# Patient Record
Sex: MALE | Race: BLACK OR AFRICAN AMERICAN | NOT HISPANIC OR LATINO | Employment: FULL TIME | URBAN - NONMETROPOLITAN AREA
[De-identification: names, ages, dates, MRNs, and addresses within clinical notes are randomized per-mention and may not be internally consistent; named-entity substitution may affect disease eponyms.]

---

## 2017-01-12 ENCOUNTER — OFFICE VISIT (OUTPATIENT)
Dept: INTERNAL MEDICINE | Facility: CLINIC | Age: 27
End: 2017-01-12

## 2017-01-12 VITALS
WEIGHT: 282 LBS | SYSTOLIC BLOOD PRESSURE: 150 MMHG | DIASTOLIC BLOOD PRESSURE: 90 MMHG | TEMPERATURE: 99 F | HEART RATE: 73 BPM | HEIGHT: 72 IN | OXYGEN SATURATION: 98 % | BODY MASS INDEX: 38.19 KG/M2

## 2017-01-12 DIAGNOSIS — K62.89 ANAL PAIN: ICD-10-CM

## 2017-01-12 DIAGNOSIS — Z23 NEED FOR INFLUENZA VACCINATION: ICD-10-CM

## 2017-01-12 DIAGNOSIS — IMO0001 ELEVATED BLOOD PRESSURE: ICD-10-CM

## 2017-01-12 DIAGNOSIS — R74.8 ELEVATED CREATINE KINASE LEVEL: ICD-10-CM

## 2017-01-12 DIAGNOSIS — G43.809 OTHER MIGRAINE WITHOUT STATUS MIGRAINOSUS, NOT INTRACTABLE: Primary | ICD-10-CM

## 2017-01-12 LAB
ALBUMIN SERPL-MCNC: 4.3 G/DL (ref 3.2–4.8)
ALBUMIN/GLOB SERPL: 1.5 G/DL (ref 1.5–2.5)
ALP SERPL-CCNC: 68 U/L (ref 25–100)
ALT SERPL W P-5'-P-CCNC: 36 U/L (ref 7–40)
ANION GAP SERPL CALCULATED.3IONS-SCNC: 15 MMOL/L (ref 3–11)
AST SERPL-CCNC: 42 U/L (ref 0–33)
BILIRUB SERPL-MCNC: 0.4 MG/DL (ref 0.3–1.2)
BUN BLD-MCNC: 14 MG/DL (ref 9–23)
BUN/CREAT SERPL: 12.7 (ref 7–25)
CALCIUM SPEC-SCNC: 10.2 MG/DL (ref 8.7–10.4)
CHLORIDE SERPL-SCNC: 103 MMOL/L (ref 99–109)
CK SERPL-CCNC: 1183 U/L (ref 26–174)
CO2 SERPL-SCNC: 27 MMOL/L (ref 20–31)
CREAT BLD-MCNC: 1.1 MG/DL (ref 0.6–1.3)
GFR SERPL CREATININE-BSD FRML MDRD: 100 ML/MIN/1.73
GLOBULIN UR ELPH-MCNC: 2.9 GM/DL
GLUCOSE BLD-MCNC: 89 MG/DL (ref 70–100)
POTASSIUM BLD-SCNC: 4.2 MMOL/L (ref 3.5–5.5)
PROT SERPL-MCNC: 7.2 G/DL (ref 5.7–8.2)
SODIUM BLD-SCNC: 145 MMOL/L (ref 132–146)

## 2017-01-12 PROCEDURE — 82550 ASSAY OF CK (CPK): CPT | Performed by: FAMILY MEDICINE

## 2017-01-12 PROCEDURE — 36415 COLL VENOUS BLD VENIPUNCTURE: CPT | Performed by: FAMILY MEDICINE

## 2017-01-12 PROCEDURE — 90471 IMMUNIZATION ADMIN: CPT | Performed by: FAMILY MEDICINE

## 2017-01-12 PROCEDURE — 90686 IIV4 VACC NO PRSV 0.5 ML IM: CPT | Performed by: FAMILY MEDICINE

## 2017-01-12 PROCEDURE — 99214 OFFICE O/P EST MOD 30 MIN: CPT | Performed by: FAMILY MEDICINE

## 2017-01-12 PROCEDURE — 80053 COMPREHEN METABOLIC PANEL: CPT | Performed by: FAMILY MEDICINE

## 2017-01-12 RX ORDER — SUMATRIPTAN 25 MG/1
25 TABLET, FILM COATED ORAL ONCE AS NEEDED
Qty: 9 TABLET | Refills: 0 | Status: SHIPPED | OUTPATIENT
Start: 2017-01-12 | End: 2017-01-25

## 2017-01-12 NOTE — PROGRESS NOTES
Subjective   Britton Vogt Jr. is a 26 y.o. male.     History of Present Illness   Has sharp pain at times going down leg on right only. Sometimes just an annoying pain. Pain only on right side of head. Gets headaches only on the right. Pain described as sometimes constant, sometimes throbbing at temple. Goes toward ear and does same there. No tinnitus. Not dizzy. Headaches happen daily, not same time. It woke him up from sleep last night. Took Excedrin before work a few days ago and that did not help. Sometimes gets nausea with headaches. Denies photo- and phonophobia. He tried going back to working out but did not gain any muscle, stopped after a few months. Sometimes gets sharp pains in anus. If he lays a certain way it hurts. Has had that pain for a few weeks, feels like it's getting worse. No mucus, pus in stool nor blood or black color. No straining for BM, he feels they're normal.    The following portions of the patient's history were reviewed and updated as appropriate: allergies, current medications, past family history, past medical history, past social history, past surgical history and problem list.    Review of Systems   Gastrointestinal: Negative for abdominal pain, blood in stool and constipation.   Musculoskeletal: Positive for back pain.       Objective   Physical Exam   Constitutional: He is oriented to person, place, and time. He appears well-developed and well-nourished. He does not appear ill. No distress.   Appears stated age. Well groomed.      HENT:   Head: Normocephalic and atraumatic.   Eyes: EOM are normal. Pupils are equal, round, and reactive to light.   Pulmonary/Chest: Effort normal.   Genitourinary: Rectal exam shows no external hemorrhoid, no fissure, no mass, no tenderness and anal tone normal.   Neurological: He is alert and oriented to person, place, and time.   Skin: Skin is warm. He is not diaphoretic.   Psychiatric: His speech is normal and behavior is normal. Judgment  normal. His mood appears anxious.   Nursing note and vitals reviewed.     last fall.  MRI brain not worrisome.    Assessment/Plan   Britton was seen today for back pain.    Diagnoses and all orders for this visit:    Other migraine without status migrainosus, not intractable  -     SUMAtriptan (IMITREX) 25 MG tablet; Take 1 tablet by mouth 1 (One) Time As Needed for migraine for up to 1 dose.    Anal pain    Elevated creatine kinase level  -     Comprehensive Metabolic Panel; Future  -     CK; Future  -     Comprehensive Metabolic Panel  -     CK    Elevated blood pressure    Need for influenza vaccination  -     Flu Vaccine Greater Than or Equal To 4yo Preservative Free IM      Influenza vaccination discussed and given. Educated patient it takes 2 weeks to develop antibodies so not fully protected immediately. Also educated that the vaccination does not cause the flu.  Encouraged neurology follow up. Need to consider muscle biopsy with continued muscle issues. Unclear what the anal pain is from, could be related to his back issues and I encouraged him to mention at neurology visit. Jaylene Crowley cma, served as chaperone for exam. BP up some but patient did not know my clinic had moved, was late getting here as he went to the [place] office. Normal BPs in the past.

## 2017-01-12 NOTE — MR AVS SNAPSHOT
Britton Vogt JrCici   1/12/2017 3:15 PM   Office Visit    Provider:  Eliza Mayberry MD   Department:  [facility] PRIMARY CARE   Dept Phone:  [phone]                Your Full Care Plan              Today's Medication Changes          These changes are accurate as of: 1/12/17  4:42 PM.  If you have any questions, ask your nurse or doctor.               New Medication(s)Ordered:     SUMAtriptan 25 MG tablet   Commonly known as:  IMITREX   Take 1 tablet by mouth 1 (One) Time As Needed for migraine for up to 1 dose.   Started by:  Eliaz Mayberry MD            Where to Get Your Medications      These medications were sent to [facility]/pharmacy #[id number] - [place], [place] - [address] [phone] [place] [phone]   [address] [address]    Hours:  24-hours Phone:  [phone]     SUMAtriptan 25 MG tablet                  Your Updated Medication List          This list is accurate as of: 1/12/17  4:42 PM.  Always use your most recent med list.                SUMAtriptan 25 MG tablet   Commonly known as:  IMITREX   Take 1 tablet by mouth 1 (One) Time As Needed for migraine for up to 1 dose.               We Performed the Following     Flu Vaccine Greater Than or Equal To 2yo Preservative Free IM       You Were Diagnosed With        Codes Comments    Other migraine without status migrainosus, not intractable    -  Primary ICD-10-CM: G43.809     Elevated creatine kinase level     ICD-10-CM: R74.8  ICD-9-CM: 790.5     Need for influenza vaccination     ICD-10-CM: Z23  ICD-9-CM: V04.81       Instructions     None    Patient Instructions History      MyChart Signup     Our records indicate that you have declined [facility] [employer] signup. If you would like to sign up for [employer], please email [email or web address] or call [phone] to obtain an activation code.             Other Info from Your Visit           Your Appointments     "Jan 25, 2017  8:00 AM EST   Follow Up with RENAY Fernandes   [facility] NEUROLOGY (--)    [address]  [facility] [address] [phone]           Arrive 15 minutes prior to appointment.              Allergies     No Known Allergies      Reason for Visit     Back Pain 6MO F/U      Vital Signs     Blood Pressure Pulse Temperature Height Weight Oxygen Saturation    150/90 73 99 °F (37.2 °C) 72\" (182.9 cm) 282 lb (128 kg) 98%    Body Mass Index Smoking Status                38.25 kg/m2 Never Smoker          Problems and Diagnoses Noted     Elevated creatine kinase level    Migraines    Needs flu shot          "

## 2017-01-13 DIAGNOSIS — R74.8 ELEVATED CREATINE KINASE LEVEL: Primary | ICD-10-CM

## 2017-01-13 DIAGNOSIS — R25.3 MUSCLE TWITCH: ICD-10-CM

## 2017-01-25 ENCOUNTER — OFFICE VISIT (OUTPATIENT)
Dept: NEUROLOGY | Facility: CLINIC | Age: 27
End: 2017-01-25

## 2017-01-25 VITALS
HEIGHT: 72 IN | BODY MASS INDEX: 37.93 KG/M2 | HEART RATE: 86 BPM | RESPIRATION RATE: 18 BRPM | DIASTOLIC BLOOD PRESSURE: 80 MMHG | SYSTOLIC BLOOD PRESSURE: 124 MMHG | OXYGEN SATURATION: 99 % | WEIGHT: 280 LBS

## 2017-01-25 DIAGNOSIS — R29.2 HYPOREFLEXIA: ICD-10-CM

## 2017-01-25 DIAGNOSIS — M51.36 BULGING LUMBAR DISC: ICD-10-CM

## 2017-01-25 DIAGNOSIS — R74.8 ELEVATED CREATINE KINASE LEVEL: Primary | ICD-10-CM

## 2017-01-25 DIAGNOSIS — R53.1 GENERALIZED WEAKNESS: ICD-10-CM

## 2017-01-25 DIAGNOSIS — G43.809 OTHER MIGRAINE WITHOUT STATUS MIGRAINOSUS, NOT INTRACTABLE: ICD-10-CM

## 2017-01-25 DIAGNOSIS — K62.89 ANAL PAIN: ICD-10-CM

## 2017-01-25 DIAGNOSIS — M54.16 LUMBAR RADICULOPATHY: ICD-10-CM

## 2017-01-25 PROBLEM — M51.369 BULGING LUMBAR DISC: Status: ACTIVE | Noted: 2017-01-25

## 2017-01-25 PROCEDURE — 99214 OFFICE O/P EST MOD 30 MIN: CPT | Performed by: NURSE PRACTITIONER

## 2017-01-25 RX ORDER — TOPIRAMATE 50 MG/1
50 TABLET, FILM COATED ORAL NIGHTLY
Qty: 30 TABLET | Refills: 5 | Status: SHIPPED | OUTPATIENT
Start: 2017-01-25 | End: 2017-08-23

## 2017-01-25 RX ORDER — SUMATRIPTAN 50 MG/1
50 TABLET, FILM COATED ORAL
Qty: 9 TABLET | Refills: 5 | Status: SHIPPED | OUTPATIENT
Start: 2017-01-25 | End: 2017-08-23

## 2017-01-25 NOTE — PROGRESS NOTES
"Subjective:     Patient ID: Britton Vogt Jr. is a 26 y.o. male.    History of Present Illness     Here for 6 month follow up on elevated creatinine kinase levels. 3/9/16 CK 2358 then 3/25/16  without working out, 6/15/16  and repeat CK 1/12/17 by PCP 1183 without working out. He tells me he feels weak in both legs and he cannot seem to put on muscle. He tells me when he squats or does a push up he feels like his legs and arms are very tight. He has not been working out recently since it has become difficult to do this. He was referred by his PCP to [facility] Dr. Cole Bishop 2/15/17 at 1020am for further eval and possible muscle biopsy. He tells me he feels tired and just \"not well\" and weak. He feels physically drained. He feels like he has progressively gotten worse and he tells me he just did not want to come back sooner and tells me he is \"hard-headed\". He also feels like his arms are weaker now as well. He has not worked out or lifted any weights in about 1-2 months due to feeling too weak and not being able to build any muscle mass. MRI L spine 3/2016 showed mild L4/L5 bulging disc without cord compromise. Has tingling from right lower back down to toes and occasionally sharp, shooting pains but no numbness and not as severe as previously seen in the past. If he sits for a long period of time his right leg will fall asleep and go numb but not with walking. No bowel or bladder incontinence. No perineal numbness. EMG/NCVS RLE 3/2016 WNL.    He also reports he gets migraine headaches every few days and he tells me these are always on the right side, can last several hours to an entire day. He has throbbing, photophobia, no phonophobia, nausea but no vomiting, pain level ranges from 7-8/10. He has been prescribed Imitrex and he is not sure if this help since he took it before bed but he did not wake up with a headache. He has never taken any preventive medications for migraines. He has tried " Excedrin OTC and this did not help; has also tried Tylenol, Naproxen and Ibuprofen and neither helped his migraines. He has had migraines for about 2 years or so. He feels like these are slightly worse now. Migraines do run in his family with his maternal grandmother. MRI brain 2/2016 WNL.    He also mentions he has experienced anal sharpness and pain which can occur with and without a BM, but no gas pains and had normal anal exam with PCP a couple weeks ago. This comes and goes. He denies blood in stool. He occasionally gets a sharp pain down his right leg as well when he has the anal pain. This has been going on about 1 month or so.    The following portions of the patient's history were reviewed and updated as appropriate: allergies, current medications, past family history, past medical history, past social history, past surgical history and problem list.    Review of Systems   Constitutional: Negative for chills, fatigue, fever and unexpected weight change.   HENT: Positive for ear pain. Negative for hearing loss, nosebleeds, rhinorrhea and sore throat.    Eyes: Negative for photophobia, pain, discharge, itching and visual disturbance.   Respiratory: Negative for cough, chest tightness, shortness of breath and wheezing.    Cardiovascular: Positive for chest pain. Negative for palpitations and leg swelling.   Gastrointestinal: Positive for diarrhea. Negative for abdominal pain, blood in stool, constipation, nausea and vomiting.   Genitourinary: Negative for dysuria, frequency, hematuria and urgency.   Musculoskeletal: Negative for arthralgias, back pain, gait problem, joint swelling, myalgias, neck pain and neck stiffness.   Skin: Negative for rash and wound.   Allergic/Immunologic: Negative for environmental allergies and food allergies.   Neurological: Positive for weakness and headaches. Negative for dizziness, tremors, seizures, syncope, speech difficulty, light-headedness and numbness.        TINGLING    Hematological: Negative for adenopathy. Does not bruise/bleed easily.   Psychiatric/Behavioral: Negative for agitation, confusion, decreased concentration, hallucinations, sleep disturbance and suicidal ideas. The patient is not nervous/anxious.         Objective:    Neurologic Exam     Mental Status   Oriented to person, place, and time.   Registration: recalls 3 of 3 objects. Recall at 5 minutes: recalls 3 of 3 objects. Follows 3 step commands.   Attention: normal. Concentration: normal.   Speech: speech is normal   Level of consciousness: alert  Knowledge: good and consistent with education. Able to perform simple calculations.   Able to name object. Able to read. Able to repeat. Able to write. Normal comprehension.     Cranial Nerves   Cranial nerves II through XII intact.     Motor Exam   Muscle bulk: normal  Overall muscle tone: normal    Strength   Strength 5/5 throughout.        He has 5/5 strength but he feels weak.     Sensory Exam   Light touch normal.   Vibration normal.   Proprioception normal.   Pinprick normal.     Gait, Coordination, and Reflexes     Gait  Gait: normal    Coordination   Romberg: negative  Finger to nose coordination: normal  Heel to shin coordination: normal    Tremor   Resting tremor: absent  Intention tremor: absent  Action tremor: absent    Reflexes   Right brachioradialis: 1+  Left brachioradialis: 1+  Right biceps: 1+  Left biceps: 1+  Right triceps: 1+  Left triceps: 1+  Right patellar: 0  Left patellar: 0  Right achilles: 0  Left achilles: 0  Right : 2+  Left : 2+  Right plantar: normal  Left plantar: normal  Right Thompson: absent  Left Thompson: absent  Right ankle clonus: absent  Left ankle clonus: absent       Negative SLR.       Physical Exam   Constitutional: He is oriented to person, place, and time. He appears well-developed and well-nourished.   Eyes:   Fundoscopic exam:       The right eye shows red reflex.        The left eye shows red reflex.    Neurological: He is oriented to person, place, and time. He has normal strength. He has a normal Finger-Nose-Finger Test, a normal Heel to Broussard Test and a normal Romberg Test. Gait normal.   Reflex Scores:       Tricep reflexes are 1+ on the right side and 1+ on the left side.       Bicep reflexes are 1+ on the right side and 1+ on the left side.       Brachioradialis reflexes are 1+ on the right side and 1+ on the left side.       Patellar reflexes are 0 on the right side and 0 on the left side.       Achilles reflexes are 0 on the right side and 0 on the left side.  Psychiatric: His speech is normal and behavior is normal. Judgment and thought content normal. His mood appears anxious. Cognition and memory are normal.       Assessment/Plan:     Britton was seen today for numbness.    Diagnoses and all orders for this visit:    Elevated creatine kinase level    Other migraine without status migrainosus, not intractable  -     topiramate (TOPAMAX) 50 MG tablet; Take 1 tablet by mouth Every Night.  -     SUMAtriptan (IMITREX) 50 MG tablet; Take 1 tablet by mouth Every 2 (Two) Hours As Needed for migraine.    Lumbar radiculopathy  -     MRI Lumbar Spine With & Without Contrast    Hyporeflexia  -     MRI Lumbar Spine With & Without Contrast    Bulging lumbar disc  -     MRI Lumbar Spine With & Without Contrast    Anal pain  -     MRI Lumbar Spine With & Without Contrast    Generalized weakness       MRI L spine to be repeated with right leg and anal pains to r/o lesion or spinal stenosis. We will start him on Topamax for migraine prevention and increase his Imitrex for PRN migraines. I have recommended he push fluids and avoid working out or lifting weights until he see [facility]. I have printed out his labs, EMG/NCVS RLE and MRI reports for him to take to his Rheumatology appointment. I will defer further labs and muscle biopsy to their office since he will see them in 3 weeks. We will f/u with him in 6 weeks.  Reviewed medications, potential side effects and signs and symptoms to report. Discussed risk versus benefits of treatment plan with patient and/or family-including medications, labs and radiology that may be ordered. Addressed questions and concerns during visit. Patient and/or family verbalized understanding and agree with plan. Total time of visit today was 40 minutes with 30 minutes spent in education, counseling and coordination of care.    During this visit the following were done:  Labs Reviewed [x]    Labs Ordered []    Radiology Reports Reviewed [x]    Radiology Ordered [x]    PCP Records Reviewed []    Referring Provider Records Reviewed []    ER Records Reviewed []    Hospital Records Reviewed []    History Obtained From Family []    Radiology Images Reviewed [x]    Other Reviewed []    Records Requested []

## 2017-01-25 NOTE — MR AVS SNAPSHOT
Britton Vogt JrCici   1/25/2017 8:00 AM   Office Visit    Dept Phone:  [phone]   Encounter #:  [id number]    Provider:  RENAY Fernandes   Department:  [facility] NEUROLOGY                Your Full Care Plan              Today's Medication Changes          These changes are accurate as of: 1/25/17  8:51 AM.  If you have any questions, ask your nurse or doctor.               New Medication(s)Ordered:     topiramate 50 MG tablet   Commonly known as:  TOPAMAX   Take 1 tablet by mouth Every Night.   Started by:  RENAY Fernandes         Medication(s)that have changed:     SUMAtriptan 50 MG tablet   Commonly known as:  IMITREX   Take 1 tablet by mouth Every 2 (Two) Hours As Needed for migraine.   What changed:    - medication strength  - how much to take  - when to take this   Changed by:  RENAY Fernandes            Where to Get Your Medications      These medications were sent to [facility]/pharmacy #[id number] - [place], [place] - [address] [phone]  - [phone] [place] [address]    Hours:  24-hours Phone:  [phone]     SUMAtriptan 50 MG tablet    topiramate 50 MG tablet                  Your Updated Medication List          This list is accurate as of: 1/25/17  8:51 AM.  Always use your most recent med list.                SUMAtriptan 50 MG tablet   Commonly known as:  IMITREX   Take 1 tablet by mouth Every 2 (Two) Hours As Needed for migraine.       topiramate 50 MG tablet   Commonly known as:  TOPAMAX   Take 1 tablet by mouth Every Night.               We Performed the Following     MRI Lumbar Spine With & Without Contrast       You Were Diagnosed With        Codes Comments    Elevated creatine kinase level    -  Primary ICD-10-CM: R74.8  ICD-9-CM: 790.5     Other migraine without status migrainosus, not intractable     ICD-10-CM: G43.809     Lumbar radiculopathy     ICD-10-CM: M54.16  ICD-9-CM: 724.4     "   Hyporeflexia     ICD-10-CM: R29.2  ICD-9-CM: 796.1     Bulging lumbar disc     ICD-10-CM: M51.26  ICD-9-CM: 722.10     Anal pain     ICD-10-CM: K62.89  ICD-9-CM: 569.42     Generalized weakness     ICD-10-CM: R53.1  ICD-9-CM: 780.79       Instructions     None    Patient Instructions History      Upcoming Appointments     Visit Type Date Time Department    FOLLOW UP 1/25/2017  8:00 AM [facility]    FOLLOW UP 3/10/2017  8:00 AM [facility]      MyChart Signup     Our records indicate that you have declined [facility] signup. If you would like to sign up for [employer], please email [email or web address] or call [phone] to obtain an activation code.             Other Info from Your Visit           Your Appointments     Mar 10, 2017  8:00 AM EST   Follow Up with RENAY Fernandes   [facility] NEUR[facility] (--)    [address] [place] [address] [phone]           Arrive 15 minutes prior to appointment.              Allergies     No Known Allergies      Reason for Visit     Numbness           Vital Signs     Blood Pressure Pulse Respirations Height Weight Oxygen Saturation    124/80 86 18 72\" (182.9 cm) 280 lb (127 kg) 99%    Body Mass Index Smoking Status                37.97 kg/m2 Never Smoker          Problems and Diagnoses Noted     Anal pain    Bulging lumbar disc    Elevated creatine kinase level    Generalized weakness    Abnormal reflex    Lumbar nerve root disorder    Migraines        "

## 2017-01-25 NOTE — LETTER
"January 25, 2017     Eliza Mayberry MD  [address]  [facility] [address]    Patient: Britton Vogt Jr.   YOB: 1990   Date of Visit: 1/25/2017       Dear Dr. Jefe MD:    Britton Vogt was in my office today. Below is a copy of my note.    If you have questions, please do not hesitate to call me. I look forward to following Britton along with you.         Sincerely,        RENAY Fernandes        CC: Cole Bishop MD    Subjective:     Patient ID: Britton Vogt Jr. is a 26 y.o. male.    History of Present Illness     Here for 6 month follow up on elevated creatinine kinase levels. 3/9/16 CK 2358 then 3/25/16  without working out, 6/15/16  and repeat CK 1/12/17 by PCP 1183 without working out. He tells me he feels weak in both legs and he cannot seem to put on muscle. He tells me when he squats or does a push up he feels like his legs and arms are very tight. He has not been working out recently since it has become difficult to do this. He was referred by his PCP to [facility] Dr. Cole Bishop 2/15/17 at 1020am for further eval and possible muscle biopsy. He tells me he feels tired and just \"not well\" and weak. He feels physically drained. He feels like he has progressively gotten worse and he tells me he just did not want to come back sooner and tells me he is \"hard-headed\". He also feels like his arms are weaker now as well. He has not worked out or lifted any weights in about 1-2 months due to feeling too weak and not being able to build any muscle mass. MRI L spine 3/2016 showed mild L4/L5 bulging disc without cord compromise. Has tingling from right lower back down to toes and occasionally sharp, shooting pains but no numbness and not as severe as previously seen in the past. If he sits for a long period of time his right leg will fall asleep and go numb but not with walking. No bowel or bladder incontinence. No perineal numbness. EMG/NCVS RLE 3/2016 " WNL.    He also reports he gets migraine headaches every few days and he tells me these are always on the right side, can last several hours to an entire day. He has throbbing, photophobia, no phonophobia, nausea but no vomiting, pain level ranges from 7-8/10. He has been prescribed Imitrex and he is not sure if this help since he took it before bed but he did not wake up with a headache. He has never taken any preventive medications for migraines. He has tried Excedrin OTC and this did not help; has also tried Tylenol, Naproxen and Ibuprofen and neither helped his migraines. He has had migraines for about 2 years or so. He feels like these are slightly worse now. Migraines do run in his family with his maternal grandmother. MRI brain 2/2016 WNL.    He also mentions he has experienced anal sharpness and pain which can occur with and without a BM, but no gas pains and had normal anal exam with PCP a couple weeks ago. This comes and goes. He denies blood in stool. He occasionally gets a sharp pain down his right leg as well when he has the anal pain. This has been going on about 1 month or so.    The following portions of the patient's history were reviewed and updated as appropriate: allergies, current medications, past family history, past medical history, past social history, past surgical history and problem list.    Review of Systems   Constitutional: Negative for chills, fatigue, fever and unexpected weight change.   HENT: Positive for ear pain. Negative for hearing loss, nosebleeds, rhinorrhea and sore throat.    Eyes: Negative for photophobia, pain, discharge, itching and visual disturbance.   Respiratory: Negative for cough, chest tightness, shortness of breath and wheezing.    Cardiovascular: Positive for chest pain. Negative for palpitations and leg swelling.   Gastrointestinal: Positive for diarrhea. Negative for abdominal pain, blood in stool, constipation, nausea and vomiting.   Genitourinary: Negative  for dysuria, frequency, hematuria and urgency.   Musculoskeletal: Negative for arthralgias, back pain, gait problem, joint swelling, myalgias, neck pain and neck stiffness.   Skin: Negative for rash and wound.   Allergic/Immunologic: Negative for environmental allergies and food allergies.   Neurological: Positive for weakness and headaches. Negative for dizziness, tremors, seizures, syncope, speech difficulty, light-headedness and numbness.        TINGLING   Hematological: Negative for adenopathy. Does not bruise/bleed easily.   Psychiatric/Behavioral: Negative for agitation, confusion, decreased concentration, hallucinations, sleep disturbance and suicidal ideas. The patient is not nervous/anxious.         Objective:    Neurologic Exam     Mental Status   Oriented to person, place, and time.   Registration: recalls 3 of 3 objects. Recall at 5 minutes: recalls 3 of 3 objects. Follows 3 step commands.   Attention: normal. Concentration: normal.   Speech: speech is normal   Level of consciousness: alert  Knowledge: good and consistent with education. Able to perform simple calculations.   Able to name object. Able to read. Able to repeat. Able to write. Normal comprehension.     Cranial Nerves   Cranial nerves II through XII intact.     Motor Exam   Muscle bulk: normal  Overall muscle tone: normal    Strength   Strength 5/5 throughout.        He has 5/5 strength but he feels weak.     Sensory Exam   Light touch normal.   Vibration normal.   Proprioception normal.   Pinprick normal.     Gait, Coordination, and Reflexes     Gait  Gait: normal    Coordination   Romberg: negative  Finger to nose coordination: normal  Heel to shin coordination: normal    Tremor   Resting tremor: absent  Intention tremor: absent  Action tremor: absent    Reflexes   Right brachioradialis: 1+  Left brachioradialis: 1+  Right biceps: 1+  Left biceps: 1+  Right triceps: 1+  Left triceps: 1+  Right patellar: 0  Left patellar: 0  Right achilles:  0  Left achilles: 0  Right : 2+  Left : 2+  Right plantar: normal  Left plantar: normal  Right Thompson: absent  Left Thompson: absent  Right ankle clonus: absent  Left ankle clonus: absent       Negative SLR.       Physical Exam   Constitutional: He is oriented to person, place, and time. He appears well-developed and well-nourished.   Neurological: He is oriented to person, place, and time. He has normal strength. He has a normal Finger-Nose-Finger Test, a normal Heel to Broussard Test and a normal Romberg Test. Gait normal.   Reflex Scores:       Tricep reflexes are 1+ on the right side and 1+ on the left side.       Bicep reflexes are 1+ on the right side and 1+ on the left side.       Brachioradialis reflexes are 1+ on the right side and 1+ on the left side.       Patellar reflexes are 0 on the right side and 0 on the left side.       Achilles reflexes are 0 on the right side and 0 on the left side.  Psychiatric: His speech is normal and behavior is normal. Judgment and thought content normal. Cognition and memory are normal.       Assessment/Plan:     Britton was seen today for numbness.    Diagnoses and all orders for this visit:    Elevated creatine kinase level    Other migraine without status migrainosus, not intractable  -     topiramate (TOPAMAX) 50 MG tablet; Take 1 tablet by mouth Every Night.  -     SUMAtriptan (IMITREX) 50 MG tablet; Take 1 tablet by mouth Every 2 (Two) Hours As Needed for migraine.    Lumbar radiculopathy  -     MRI Lumbar Spine With & Without Contrast    Hyporeflexia  -     MRI Lumbar Spine With & Without Contrast    Bulging lumbar disc  -     MRI Lumbar Spine With & Without Contrast    Anal pain  -     MRI Lumbar Spine With & Without Contrast    Generalized weakness       MRI L spine to be repeated with right leg and anal pains to r/o lesion or spinal stenosis. We will start him on Topamax for migraine prevention and increase his Imitrex for PRN migraines. I have recommended he  push fluids and avoid working out or lifting weights until he see  Rheumatology. I have printed out his labs, EMG/NCVS RLE and MRI reports for him to take to his Rheumatology appointment. I will defer further labs and muscle biopsy to their office since he will see them in 3 weeks. We will f/u with him in 6 weeks. Reviewed medications, potential side effects and signs and symptoms to report. Discussed risk versus benefits of treatment plan with patient and/or family-including medications, labs and radiology that may be ordered. Addressed questions and concerns during visit. Patient and/or family verbalized understanding and agree with plan. Total time of visit today was 40 minutes with 30 minutes spent in education, counseling and coordination of care.    During this visit the following were done:  Labs Reviewed [x]    Labs Ordered []    Radiology Reports Reviewed [x]    Radiology Ordered [x]    PCP Records Reviewed []    Referring Provider Records Reviewed []    ER Records Reviewed []    Hospital Records Reviewed []    History Obtained From Family []    Radiology Images Reviewed [x]    Other Reviewed []    Records Requested []

## 2017-01-25 NOTE — Clinical Note
January 25, 2017     Eliza Mayberry MD  [address] [facility] [address] [address]  [facility] [address]    Patient: Britton Vogt Jr.   YOB: 1990   Date of Visit: 1/25/2017       Dear Dr. Jefe MD:    Thank you for referring Britton Vogt to me for evaluation. Below are the relevant portions of my assessment and plan of care.       Britton was seen today for numbness.    Diagnoses and all orders for this visit:    Elevated creatine kinase level    Other migraine without status migrainosus, not intractable  -     topiramate (TOPAMAX) 50 MG tablet; Take 1 tablet by mouth Every Night.  -     SUMAtriptan (IMITREX) 50 MG tablet; Take 1 tablet by mouth Every 2 (Two) Hours As Needed for migraine.    Lumbar radiculopathy  -     MRI Lumbar Spine With & Without Contrast    Hyporeflexia  -     MRI Lumbar Spine With & Without Contrast    Bulging lumbar disc  -     MRI Lumbar Spine With & Without Contrast    Anal pain  -     MRI Lumbar Spine With & Without Contrast    Generalized weakness               If you have questions, please do not hesitate to call me. I look forward to following Britton along with you.         Sincerely,        RENAY Fernandes        CC: No Recipients

## 2017-01-27 DIAGNOSIS — R29.2 HYPOREFLEXIA: ICD-10-CM

## 2017-01-27 DIAGNOSIS — M51.36 BULGING LUMBAR DISC: ICD-10-CM

## 2017-01-27 DIAGNOSIS — R53.1 GENERALIZED WEAKNESS: ICD-10-CM

## 2017-01-27 DIAGNOSIS — M54.16 LUMBAR RADICULOPATHY: Primary | ICD-10-CM

## 2017-02-04 ENCOUNTER — APPOINTMENT (OUTPATIENT)
Dept: MRI IMAGING | Facility: HOSPITAL | Age: 27
End: 2017-02-04

## 2017-02-14 ENCOUNTER — TELEPHONE (OUTPATIENT)
Dept: INTERNAL MEDICINE | Facility: CLINIC | Age: 27
End: 2017-02-14

## 2017-02-14 NOTE — TELEPHONE ENCOUNTER
Called patient back as he had requested a call from me. Frustrated he has not gotten a diagnosis. Continues to feel weak, has chest pain sometimes. Scheduled tomorrow for muscle biopsy. He has a visit scheduled here on Friday and asks that I test for anything else he could possibly have. Discussed case with patient, I suggest waiting on muscle biopsy (which I feel will give an answer based on symptoms and CK elevations) and if negative can pursue additional testing. He was agreeable to this and was okay with cancelling his appointment on Friday.

## 2017-02-16 ENCOUNTER — TELEPHONE (OUTPATIENT)
Dept: INTERNAL MEDICINE | Facility: CLINIC | Age: 27
End: 2017-02-16

## 2017-02-16 NOTE — TELEPHONE ENCOUNTER
PATIENT CALLED, HE WENT TO DR VELIZ TODAY, HE DIDN'T WANT TO DO THE MUSCLE BIOPSY. PATIENT IS CONFUSED, HE IS REQUESTING A CALL FROM YOU. [phone]. THANK YOU.

## 2017-02-17 NOTE — TELEPHONE ENCOUNTER
I'd like to see the doctor's note. I'm not sure what's going on. Patient told me he was getting the biopsy, I was not told by somebody else. Please let him know I'll see if we can send him somewhere else as I feel the biopsy is VITAL to his diagnosis  .

## 2017-02-17 NOTE — TELEPHONE ENCOUNTER
Can you call around and figure out who can do a muscle biopsy? This patient is in need as I suspect a muscle disorder.

## 2017-02-21 NOTE — TELEPHONE ENCOUNTER
Will you give him a call and let him know this? Can he call them and try to get in sooner? That's too far out. Is there another provider in the [place] area that can do it? [facility] or another neurology office?

## 2017-02-21 NOTE — TELEPHONE ENCOUNTER
Rheumatology can do them. Florecita said she has referred someone to [facility] for a muscle biopsy before.

## 2017-02-21 NOTE — TELEPHONE ENCOUNTER
We called a few more places around [place] and landed on Dr. Morales at [facility]. He can get him in on 2/28 @ 10:15. I'll let him know! At least we finally found one.

## 2017-07-24 ENCOUNTER — OFFICE VISIT (OUTPATIENT)
Dept: INTERNAL MEDICINE | Facility: CLINIC | Age: 27
End: 2017-07-24

## 2017-07-24 VITALS
DIASTOLIC BLOOD PRESSURE: 70 MMHG | OXYGEN SATURATION: 97 % | BODY MASS INDEX: 36.44 KG/M2 | HEART RATE: 64 BPM | SYSTOLIC BLOOD PRESSURE: 124 MMHG | TEMPERATURE: 97.8 F | HEIGHT: 72 IN | WEIGHT: 269 LBS

## 2017-07-24 DIAGNOSIS — N50.811 PAIN IN RIGHT TESTICLE: Primary | ICD-10-CM

## 2017-07-24 DIAGNOSIS — Z82.49 FAMILY HISTORY OF HEART FAILURE: ICD-10-CM

## 2017-07-24 DIAGNOSIS — R07.9 CHEST PAIN, UNSPECIFIED TYPE: ICD-10-CM

## 2017-07-24 DIAGNOSIS — R00.2 PALPITATIONS: ICD-10-CM

## 2017-07-24 LAB
BILIRUB BLD-MCNC: NEGATIVE MG/DL
CLARITY, POC: CLEAR
COLOR UR: YELLOW
GLUCOSE UR STRIP-MCNC: NEGATIVE MG/DL
KETONES UR QL: NEGATIVE
LEUKOCYTE EST, POC: NEGATIVE
NITRITE UR-MCNC: NEGATIVE MG/ML
PH UR: 5 [PH] (ref 5–8)
PROT UR STRIP-MCNC: NEGATIVE MG/DL
RBC # UR STRIP: NEGATIVE /UL
SP GR UR: 1.01 (ref 1–1.03)
UROBILINOGEN UR QL: NORMAL

## 2017-07-24 PROCEDURE — 99213 OFFICE O/P EST LOW 20 MIN: CPT | Performed by: FAMILY MEDICINE

## 2017-07-24 PROCEDURE — 81003 URINALYSIS AUTO W/O SCOPE: CPT | Performed by: FAMILY MEDICINE

## 2017-07-24 NOTE — PROGRESS NOTES
"Subjective    Britton Vogt Jr. is a 26 y.o. male here for:  Chief Complaint   Patient presents with   • Groin Pain     RT TESTICLE; SHARP PAIN. FIRST NOTICED A FEW WEEKS AGO, HAS PROGRESSIVELY GOT WORSE.     History of Present Illness   He's having pain in right testicle. First noted a few weeks to months ago. He fingured it was from straining so he did not pay attention to it. Lately it has gotten worse, sometimes has sharp pains. Pain worse with sitting certain way. No constipation, no blood in bowel movements. No dysuria, no relief with intercourse. No swelling that he's noticed nor masses.  No change in sexual partners, he had negative STD testing a few months ago and he notes there was an \"awareness\" of the pain when it was done. He's not appreciated any relief of pain with lifting of scrotum.    He also worries about his heart. He has had some sort of work up at [facility] and he's going to try to get those records. Sister just  from heart failure at 41. He continues to have chest pain, tightness and palpitations.    The following portions of the patient's history were reviewed and updated as appropriate: allergies, current medications, past family history, past medical history, past social history, past surgical history and problem list.    Review of Systems   Constitutional: Positive for fatigue. Negative for activity change.   Cardiovascular: Positive for chest pain and palpitations.   Genitourinary: Positive for testicular pain. Negative for difficulty urinating, discharge and scrotal swelling.       Vitals:    17 1449   BP: 124/70   Pulse: 64   Temp: 97.8 °F (36.6 °C)   SpO2: 97%       Objective   Physical Exam   Constitutional: He is oriented to person, place, and time. Vital signs are normal. He appears well-developed and well-nourished. He is active. He does not have a sickly appearance. He does not appear ill.   Muscular build, appears stated age.   HENT:   Head: Normocephalic and atraumatic. "   Right Ear: Hearing normal.   Left Ear: Hearing normal.   Nose: Nose normal.   Eyes: EOM and lids are normal. Pupils are equal, round, and reactive to light.   Cardiovascular: Normal rate, regular rhythm and normal heart sounds.    Pulmonary/Chest: Effort normal and breath sounds normal.   Abdominal: Hernia confirmed negative in the right inguinal area.   Genitourinary: Right testis shows tenderness. Right testis shows no mass and no swelling. Right testis is descended. Left testis shows no mass, no swelling and no tenderness. Left testis is descended.   Genitourinary Comments: Jaylene Crowley, SVEN, chaperone     Neurological: He is alert and oriented to person, place, and time. No cranial nerve deficit. Gait normal.   Skin: Skin is warm. He is not diaphoretic. No cyanosis. Nails show no clubbing.   Psychiatric: He has a normal mood and affect. His speech is normal and behavior is normal. Judgment and thought content normal.   Nursing note and vitals reviewed.       Brief Urine Lab Results  (Last result in the past 365 days)      Color   Clarity   Blood   Leuk Est   Nitrite   Protein   CREAT   Urine HCG        07/24/17 1618 Yellow Clear Negative Negative Negative Negative                 Assessment/Plan   Britton was seen today for groin pain.    Diagnoses and all orders for this visit:    Pain in right testicle  -     US scrotum and testicles; Future  -     POC Urinalysis Dipstick, Automated  -     Chlamydia trachomatis, Neisseria gonorrhoeae, PCR    Palpitations  -     Ambulatory Referral to Cardiology    Chest pain, unspecified type  -     Ambulatory Referral to Cardiology    Family history of heart failure  -     Ambulatory Referral to Cardiology               Eliza Mayberry MD

## 2017-07-27 ENCOUNTER — HOSPITAL ENCOUNTER (OUTPATIENT)
Dept: ULTRASOUND IMAGING | Facility: HOSPITAL | Age: 27
Discharge: HOME | End: 2017-07-27
Attending: FAMILY MEDICINE | Admitting: FAMILY MEDICINE

## 2017-07-27 DIAGNOSIS — N50.811 PAIN IN RIGHT TESTICLE: ICD-10-CM

## 2017-07-27 LAB
C TRACH RRNA SPEC QL NAA+PROBE: NEGATIVE
N GONORRHOEA RRNA SPEC QL NAA+PROBE: NEGATIVE

## 2017-07-27 PROCEDURE — 76870 US EXAM SCROTUM: CPT

## 2017-07-27 PROCEDURE — 93976 VASCULAR STUDY: CPT

## 2017-08-23 ENCOUNTER — CONSULT (OUTPATIENT)
Dept: CARDIOLOGY | Facility: CLINIC | Age: 27
End: 2017-08-23

## 2017-08-23 VITALS
HEART RATE: 68 BPM | HEIGHT: 72 IN | SYSTOLIC BLOOD PRESSURE: 118 MMHG | WEIGHT: 262.8 LBS | DIASTOLIC BLOOD PRESSURE: 88 MMHG | BODY MASS INDEX: 35.59 KG/M2

## 2017-08-23 DIAGNOSIS — R00.2 PALPITATIONS: Primary | ICD-10-CM

## 2017-08-23 DIAGNOSIS — R55 VASODEPRESSOR SYNCOPE: ICD-10-CM

## 2017-08-23 DIAGNOSIS — R06.00 PND (PAROXYSMAL NOCTURNAL DYSPNEA): ICD-10-CM

## 2017-08-23 PROCEDURE — 99244 OFF/OP CNSLTJ NEW/EST MOD 40: CPT | Performed by: INTERNAL MEDICINE

## 2017-08-23 PROCEDURE — 93000 ELECTROCARDIOGRAM COMPLETE: CPT | Performed by: INTERNAL MEDICINE

## 2017-08-23 NOTE — PROGRESS NOTES
"[facility]  Consultation H&P  Britton Vogt Jr.  1990      VISIT DATE:  08/23/2017    PCP: Eliza Mayberry MD  [address] [place] [id number]    IDENTIFICATION: A 27 y.o. male from [facility], works at the [employer]    CC:  Chief Complaint   Patient presents with   • Palpitations       PROBLEM LIST:  1. Palpitations  2. Migraine headaches  3. Back Pain    Allergies  No Known Allergies    Current Medications  No current outpatient prescriptions on file.     History of Present Illness   HPI  This is a 27-year-old -American male with a PMH significant for migraines who presents for consult from PCP Dr. Jimenez for further evaluation of palpitations and chest tightness.    Pt reports a 2-3 year history of sharp chest pains that could be intermittent or constant for several hours at a time. Then he started feeling palpitations that would sometimes come on at the same time as the sharp pains, and sometimes associated with lightheadedness, nausea, diaphoresis. He has not experienced syncope. He states sometimes when this happens he has to rest for the next day before he feels \"normal\" again. He states that these symptoms can come on with rest or activity. He has symptoms every few days.  When he is not symptomatic he feels great and is able to do all he wants to do. On bad days he can't function well at work.  He also endorses some snoring, states that his girlfriend reports apneic spells. Has not been worked up for MARQUES.    Hx of an echo 2 years ago for bradycardia, which showed a structurally normal heart.  Additionally had a 24 hour Holter about 1.5 years ago that showed a few isolated PVCs and nothing more. He states he does not believe he was symptomatic during this 24 hour period. Has never had a stress test. Believes he's seen a cardiologist before, can't recall details. Does not smoke. Does not drink caffeine.     Pt denies any  Orthopnea, lower " "extremity edema, or claudication. Pt denies history of CHF, DVT, PE, MI, CVA, TIA, or rheumatic fever.  His sister  from heart failure in mid 40s. Multiple family members with \"heart disease\".       ROS  Review of Systems   Constitution: Positive for diaphoresis, weakness and malaise/fatigue.   HENT: Positive for headaches.    Cardiovascular: Positive for chest pain, irregular heartbeat, near-syncope and palpitations.   Psychiatric/Behavioral: The patient is nervous/anxious.    All other systems reviewed and are negative.      SOCIAL HX  Social History     Social History   • Marital status: Single     Spouse name: N/A   • Number of children: N/A   • Years of education: N/A     Occupational History   • Not on file.     Social History Main Topics   • Smoking status: Never Smoker   • Smokeless tobacco: Not on file   • Alcohol use Yes      Comment: social alcohol use   • Drug use: No   • Sexual activity: Defer     Other Topics Concern   • Not on file     Social History Narrative       FAMILY HX  Family History   Problem Relation Age of Onset   • Diabetes Mother    • Hypertension Mother    • Stroke Mother    • Depression Maternal Uncle    • Stroke Maternal Uncle    • Depression Maternal Grandmother    • Stroke Other    • Diabetes Other    • Cancer Other    • Diabetes Other    • Cancer Other    • Diabetes Other    • Cancer Other    • Heart failure Sister 41       Vitals:    17 0900   BP: 118/88   BP Location: Right arm   Patient Position: Sitting   Pulse: 68   Weight: 262 lb 12.8 oz (119 kg)   Height: 72\" (182.9 cm)       PHYSICAL EXAMINATION:  Physical Exam   Constitutional: He is oriented to person, place, and time. He appears well-developed and well-nourished. No distress.   HENT:   Head: Normocephalic and atraumatic.   Right Ear: External ear normal.   Left Ear: External ear normal.   Nose: Nose normal.   Eyes: Conjunctivae and EOM are normal.   Neck: Neck supple. No hepatojugular reflux and no JVD present. " Carotid bruit is not present. No thyromegaly present.   Cardiovascular: Normal rate, regular rhythm, S1 normal, S2 normal, normal heart sounds, intact distal pulses and normal pulses.  Exam reveals no gallop, no distant heart sounds and no midsystolic click.    No murmur heard.  Pulses:       Radial pulses are 2+ on the right side, and 2+ on the left side.        Dorsalis pedis pulses are 2+ on the right side, and 2+ on the left side.        Posterior tibial pulses are 2+ on the right side, and 2+ on the left side.   Pulmonary/Chest: Effort normal and breath sounds normal. No respiratory distress. He has no decreased breath sounds. He has no wheezes. He has no rhonchi. He has no rales.   Abdominal: Soft. Bowel sounds are normal. There is no hepatosplenomegaly. There is no tenderness.   Musculoskeletal: Normal range of motion. He exhibits no edema.   Neurological: He is alert and oriented to person, place, and time.   No focal deficits.   Skin: Skin is warm and dry. No erythema.   Psychiatric: He has a normal mood and affect. Thought content normal.   Nursing note and vitals reviewed.      Diagnostic Data:    ECG 12 Lead  Date/Time: 8/23/2017 9:47 AM  Performed by: LEODAN CORBETT  Authorized by: LEODAN CORBETT   Rhythm: sinus rhythm  BPM: 68  Clinical impression: non-specific ECG  Comments: Non specific t wave abnormality            Lab Results   Component Value Date    GLUCOSE 89 01/12/2017    BUN 14 01/12/2017    CREATININE 1.10 01/12/2017     01/12/2017    K 4.2 01/12/2017     01/12/2017    CO2 27.0 01/12/2017     No results found for: HGBA1C  Lab Results   Component Value Date    WBC 6.31 02/03/2016    HGB 15.8 02/03/2016    HCT 47.1 02/03/2016     02/03/2016       ASSESSMENT:   Diagnosis Plan   1. Palpitations  Holter / Event ZIO Patch   2. PND (paroxysmal nocturnal dyspnea)  Overnight Sleep Oximetry Study   3. Vasodepressor syncope           PLAN:  1. Palpitations with vasodepressor-like  symptoms in the setting of structurally normal heart.  We will evaluate with his IO patch today in order to decrease the likelihood that we catch symptomatic spells. Pt was also counseled on staying hydrated and avoiding situations where his blood pressure will drop.  2. With snoring and witnessed apneic spells, will evaluate with overnight pulseox to screen for MARQUES    Scribed for Ray Dunbar MD by Hafsa Rankin PA-C. 8/23/2017  9:48 AM  I, Ray Dunbar MD, personally performed the services described in this documentation as scribed by the above named individual in my presence, and it is both accurate and complete.  8/23/2017  10:07 AM    Ray Dunbar MD, FACC

## 2017-12-13 ENCOUNTER — OFFICE VISIT (OUTPATIENT)
Dept: INTERNAL MEDICINE | Facility: CLINIC | Age: 27
End: 2017-12-13

## 2017-12-13 VITALS
HEIGHT: 72 IN | WEIGHT: 278 LBS | SYSTOLIC BLOOD PRESSURE: 120 MMHG | DIASTOLIC BLOOD PRESSURE: 80 MMHG | OXYGEN SATURATION: 98 % | HEART RATE: 64 BPM | RESPIRATION RATE: 12 BRPM | TEMPERATURE: 98.2 F | BODY MASS INDEX: 37.65 KG/M2

## 2017-12-13 DIAGNOSIS — R10.33 PERIUMBILICAL ABDOMINAL PAIN: ICD-10-CM

## 2017-12-13 DIAGNOSIS — N50.811 PAIN IN RIGHT TESTICLE: Primary | ICD-10-CM

## 2017-12-13 DIAGNOSIS — N52.9 INABILITY TO MAINTAIN ERECTION: ICD-10-CM

## 2017-12-13 DIAGNOSIS — N50.89 TESTICLE SWELLING: ICD-10-CM

## 2017-12-13 DIAGNOSIS — Z28.21 INFLUENZA VACCINATION DECLINED: ICD-10-CM

## 2017-12-13 PROCEDURE — 99214 OFFICE O/P EST MOD 30 MIN: CPT | Performed by: FAMILY MEDICINE

## 2017-12-13 NOTE — PROGRESS NOTES
Subjective    Britton Vogt Jr. is a 27 y.o. male here for:  Chief Complaint   Patient presents with   • Groin Pain     History of Present Illness     Previously had ultrasound for testicle pain. Continues to have pain, getting sharp pains in The left and constant pain in right testicle. Stabbing pain around belly button and above. He's had to stop in [employer] and lean on shelf due to the pain. Has had the abdominal pain for a few weeks. No blood in stool nor black stool. No vomiting but has had nausea. Appetite is normal. He has not noticed any changes to testicles but girlfriend has said that one does not feel right.  He is having trouble maintaining an erection and this has been going on for several months.  He is becoming quite frustrated and angry with this issue.  No difficulty with urination.  Girlfriend does not have any diarrhea.  This does not feel like anything to do with his muscles and he does not feel that his CK level is checked at this time.  Patient has had rhabdomyolysis previously with elevated CK levels and has seen neurology    The following portions of the patient's history were reviewed and updated as appropriate: allergies, current medications, past family history, past medical history, past social history, past surgical history and problem list.    Review of Systems   Constitutional: Negative for appetite change.   Gastrointestinal: Positive for abdominal pain, diarrhea and nausea. Negative for blood in stool, constipation and vomiting.   Genitourinary: Positive for scrotal swelling. Negative for penile pain.       Vitals:    12/13/17 1142   BP: 120/80   Pulse: 64   Resp: 12   Temp: 98.2 °F (36.8 °C)   SpO2: 98%         Objective   Physical Exam   Constitutional: He is oriented to person, place, and time. Vital signs are normal. He appears well-developed and well-nourished.  Non-toxic appearance. He does not appear ill. No distress.   Appears stated age. Well groomed. Muscular build   HENT:    Head: Normocephalic and atraumatic.   Nose: Nose normal.   Eyes: EOM are normal. No scleral icterus.   Neck: Phonation normal. Neck supple.   Pulmonary/Chest: Effort normal.   Abdominal: Soft. Bowel sounds are normal. He exhibits no distension and no mass. There is no hepatosplenomegaly. There is tenderness in the epigastric area. There is no rigidity, no rebound and no guarding.   Musculoskeletal: He exhibits no deformity.   Neurological: He is alert and oriented to person, place, and time. He displays no tremor. No cranial nerve deficit. Gait normal.   Skin: Skin is warm. No rash noted. He is not diaphoretic.   Professional tattoos to abdomen   Psychiatric: He has a normal mood and affect. His speech is normal and behavior is normal. Cognition and memory are normal.   Seems a little down   Nursing note and vitals reviewed.    Results for orders placed during the hospital encounter of 07/27/17   US Testicular or Ovarian Vascular Limited    Narrative EXAMINATION: US SCROTUM AND TESTICLES, US TESTICULAR OR OVARIAN,  VASCULAR, LIMITED-07/27/2017:     INDICATION: Testicle pain on right; N50.811-Right testicular pain.         TECHNIQUE: Sonographic imaging was obtained of the testicles in both the  sagittal and transverse planes. Duplex interrogation was performed of  the testicles to determine both arterial and venous waveforms.     COMPARISON: NONE.     FINDINGS: The right testicle measures 3.5 x 2.1 x 2.9 cm. There is good  arterial and venous waveforms demonstrated within the right testicle.  The testicle is homogeneous with no underlying mass or lesion. The  epididymis measures 1.1 x 1.2 x 0.8 cm. No underlying mass or lesion is  identified. There is evidence of a tiny right hydrocele.     The left testicle measures 3.9 x 2.0 x 2.9 cm. There is both arterial  and venous waveforms demonstrated within the left testicle. Good color  flow present. Tiny trace hydrocele. The left epididymis measures 1.0 x  0.9 x 0.7  cm.       Impression Trace bilateral hydroceles. The remainder of the ultrasound  of the testicles is unremarkable.     D:  07/28/2017  E:  07/28/2017        This report was finalized on 7/28/2017 3:29 PM by Dr. Melly Bishop MD.            Jarvis Jarquin was seen today for groin pain.    Diagnoses and all orders for this visit:    Pain in right testicle  -     US Testicular or Ovarian Vascular Complete  -     Ambulatory Referral to Urology    Testicle swelling  -     US Testicular or Ovarian Vascular Complete  -     Ambulatory Referral to Urology    Periumbilical abdominal pain  -     US Abdomen Complete    Inability to maintain erection  -     Ambulatory Referral to Urology    Influenza vaccination declined      · I stressed to the patient that if symptoms worsen and he is in severe pain to go to the ER.  Signs and symptoms of appendicitis discussed.  Must rule out testicular malignancy given age group, though less likely as ultrasound earlier this year was not worrisome for that.      Eliza Mayberry MD

## 2017-12-15 ENCOUNTER — HOSPITAL ENCOUNTER (OUTPATIENT)
Dept: ULTRASOUND IMAGING | Facility: HOSPITAL | Age: 27
Discharge: HOME | End: 2017-12-15
Attending: FAMILY MEDICINE

## 2017-12-15 ENCOUNTER — HOSPITAL ENCOUNTER (OUTPATIENT)
Dept: ULTRASOUND IMAGING | Facility: HOSPITAL | Age: 27
Discharge: HOME | End: 2017-12-15
Attending: FAMILY MEDICINE | Admitting: FAMILY MEDICINE

## 2017-12-15 ENCOUNTER — HOSPITAL ENCOUNTER (OUTPATIENT)
Dept: ULTRASOUND IMAGING | Facility: HOSPITAL | Age: 27
Discharge: HOME | End: 2017-12-15

## 2017-12-15 DIAGNOSIS — N50.811 PAIN IN RIGHT TESTICLE: ICD-10-CM

## 2017-12-15 DIAGNOSIS — N50.89 TESTICLE SWELLING: ICD-10-CM

## 2017-12-15 PROCEDURE — 93976 VASCULAR STUDY: CPT

## 2017-12-15 PROCEDURE — 76705 ECHO EXAM OF ABDOMEN: CPT

## 2017-12-15 PROCEDURE — 76870 US EXAM SCROTUM: CPT

## 2017-12-20 VITALS
HEIGHT: 72 IN | HEART RATE: 52 BPM | SYSTOLIC BLOOD PRESSURE: 131 MMHG | DIASTOLIC BLOOD PRESSURE: 79 MMHG | OXYGEN SATURATION: 95 % | RESPIRATION RATE: 16 BRPM | WEIGHT: 275 LBS | TEMPERATURE: 98 F | BODY MASS INDEX: 37.25 KG/M2

## 2017-12-20 PROCEDURE — 99283 EMERGENCY DEPT VISIT LOW MDM: CPT

## 2017-12-21 ENCOUNTER — HOSPITAL ENCOUNTER (EMERGENCY)
Facility: HOSPITAL | Age: 27
Discharge: HOME | End: 2017-12-21
Attending: EMERGENCY MEDICINE | Admitting: EMERGENCY MEDICINE

## 2017-12-21 DIAGNOSIS — R11.2 NAUSEA AND VOMITING, INTRACTABILITY OF VOMITING NOT SPECIFIED, UNSPECIFIED VOMITING TYPE: Primary | ICD-10-CM

## 2017-12-21 LAB
ALBUMIN SERPL-MCNC: 4.1 G/DL (ref 3.2–4.8)
ALBUMIN/GLOB SERPL: 1.5 G/DL (ref 1.5–2.5)
ALP SERPL-CCNC: 63 U/L (ref 25–100)
ALT SERPL W P-5'-P-CCNC: 42 U/L (ref 7–40)
ANION GAP SERPL CALCULATED.3IONS-SCNC: 6 MMOL/L (ref 3–11)
AST SERPL-CCNC: 39 U/L (ref 0–33)
BASOPHILS # BLD AUTO: 0.01 10*3/MM3 (ref 0–0.2)
BASOPHILS NFR BLD AUTO: 0.2 % (ref 0–1)
BILIRUB SERPL-MCNC: 0.4 MG/DL (ref 0.3–1.2)
BUN BLD-MCNC: 13 MG/DL (ref 9–23)
BUN/CREAT SERPL: 13 (ref 7–25)
CALCIUM SPEC-SCNC: 9.4 MG/DL (ref 8.7–10.4)
CHLORIDE SERPL-SCNC: 106 MMOL/L (ref 99–109)
CO2 SERPL-SCNC: 28 MMOL/L (ref 20–31)
CREAT BLD-MCNC: 1 MG/DL (ref 0.6–1.3)
DEPRECATED RDW RBC AUTO: 40.7 FL (ref 37–54)
EOSINOPHIL # BLD AUTO: 0.03 10*3/MM3 (ref 0–0.3)
EOSINOPHIL NFR BLD AUTO: 0.5 % (ref 0–3)
ERYTHROCYTE [DISTWIDTH] IN BLOOD BY AUTOMATED COUNT: 12.7 % (ref 11.3–14.5)
GFR SERPL CREATININE-BSD FRML MDRD: 110 ML/MIN/1.73
GLOBULIN UR ELPH-MCNC: 2.8 GM/DL
GLUCOSE BLD-MCNC: 130 MG/DL (ref 70–100)
HCT VFR BLD AUTO: 43.1 % (ref 38.9–50.9)
HGB BLD-MCNC: 14.7 G/DL (ref 13.1–17.5)
IMM GRANULOCYTES # BLD: 0.01 10*3/MM3 (ref 0–0.03)
IMM GRANULOCYTES NFR BLD: 0.2 % (ref 0–0.6)
LIPASE SERPL-CCNC: 19 U/L (ref 6–51)
LYMPHOCYTES # BLD AUTO: 1.66 10*3/MM3 (ref 0.6–4.8)
LYMPHOCYTES NFR BLD AUTO: 26.1 % (ref 24–44)
MCH RBC QN AUTO: 30.1 PG (ref 27–31)
MCHC RBC AUTO-ENTMCNC: 34.1 G/DL (ref 32–36)
MCV RBC AUTO: 88.1 FL (ref 80–99)
MONOCYTES # BLD AUTO: 0.52 10*3/MM3 (ref 0–1)
MONOCYTES NFR BLD AUTO: 8.2 % (ref 0–12)
NEUTROPHILS # BLD AUTO: 4.14 10*3/MM3 (ref 1.5–8.3)
NEUTROPHILS NFR BLD AUTO: 64.8 % (ref 41–71)
PLATELET # BLD AUTO: 162 10*3/MM3 (ref 150–450)
PMV BLD AUTO: 11.2 FL (ref 6–12)
POTASSIUM BLD-SCNC: 4 MMOL/L (ref 3.5–5.5)
PROT SERPL-MCNC: 6.9 G/DL (ref 5.7–8.2)
RBC # BLD AUTO: 4.89 10*6/MM3 (ref 4.2–5.76)
SODIUM BLD-SCNC: 140 MMOL/L (ref 132–146)
WBC NRBC COR # BLD: 6.37 10*3/MM3 (ref 3.5–10.8)

## 2017-12-21 PROCEDURE — 96374 THER/PROPH/DIAG INJ IV PUSH: CPT

## 2017-12-21 PROCEDURE — 80053 COMPREHEN METABOLIC PANEL: CPT | Performed by: NURSE PRACTITIONER

## 2017-12-21 PROCEDURE — 96375 TX/PRO/DX INJ NEW DRUG ADDON: CPT

## 2017-12-21 PROCEDURE — 85025 COMPLETE CBC W/AUTO DIFF WBC: CPT | Performed by: NURSE PRACTITIONER

## 2017-12-21 PROCEDURE — 83690 ASSAY OF LIPASE: CPT | Performed by: NURSE PRACTITIONER

## 2017-12-21 PROCEDURE — 25010000002 ONDANSETRON PER 1 MG: Performed by: NURSE PRACTITIONER

## 2017-12-21 PROCEDURE — 96361 HYDRATE IV INFUSION ADD-ON: CPT

## 2017-12-21 RX ORDER — ONDANSETRON 4 MG/1
4 TABLET, ORALLY DISINTEGRATING ORAL 4 TIMES DAILY PRN
Qty: 12 TABLET | Refills: 0 | Status: SHIPPED | OUTPATIENT
Start: 2017-12-21 | End: 2018-02-02

## 2017-12-21 RX ORDER — SODIUM CHLORIDE 0.9 % (FLUSH) 0.9 %
10 SYRINGE (ML) INJECTION AS NEEDED
Status: DISCONTINUED | OUTPATIENT
Start: 2017-12-21 | End: 2017-12-21 | Stop reason: HOSPADM

## 2017-12-21 RX ORDER — ONDANSETRON 2 MG/ML
4 INJECTION INTRAMUSCULAR; INTRAVENOUS ONCE
Status: COMPLETED | OUTPATIENT
Start: 2017-12-21 | End: 2017-12-21

## 2017-12-21 RX ORDER — FAMOTIDINE 10 MG/ML
20 INJECTION, SOLUTION INTRAVENOUS ONCE
Status: COMPLETED | OUTPATIENT
Start: 2017-12-21 | End: 2017-12-21

## 2017-12-21 RX ADMIN — ONDANSETRON 4 MG: 2 INJECTION INTRAMUSCULAR; INTRAVENOUS at 01:06

## 2017-12-21 RX ADMIN — FAMOTIDINE 20 MG: 10 INJECTION, SOLUTION INTRAVENOUS at 01:06

## 2017-12-21 RX ADMIN — SODIUM CHLORIDE 1000 ML: 9 INJECTION, SOLUTION INTRAVENOUS at 01:07

## 2017-12-21 NOTE — ED PROVIDER NOTES
Subjective   HPI Comments: Pt Is a 27-year-old -American male that presents emergency Department with complaints of nausea, vomiting, abdominal cramping.  Patient explains that he saw a urologist today and was diagnosed with epididymitis.  Patient was placed on Cipro and Relafen.  Patient took his medications tonight and advises shortly after he began to vomit.  Patient reports he is experiencing some abdominal cramping.  Denies dizziness.  Patient denies any urinary frequency, burning, urgency.  Patient advises that the abdominal pain has subsided at this time.  Patient denies shortness of breath, chest pain.    Patient is a 27 y.o. male presenting with abdominal pain.   History provided by:  Patient  Abdominal Pain   Pain location:  Generalized  Pain quality: aching and cramping    Pain radiates to:  Does not radiate  Timing:  Intermittent  Progression:  Improving  Context comment:  New medications, Cipro and Relafen  Relieved by:  Nothing  Worsened by:  Nothing  Associated symptoms: chills, nausea and vomiting    Associated symptoms: no chest pain, no constipation, no cough, no diarrhea, no dysuria and no fever        Review of Systems   Constitutional: Positive for chills. Negative for fever.   Respiratory: Negative for cough.    Cardiovascular: Negative for chest pain.   Gastrointestinal: Positive for abdominal pain, nausea and vomiting. Negative for constipation and diarrhea.   Genitourinary: Negative for dysuria.   All other systems reviewed and are negative.      Past Medical History:   Diagnosis Date   • Anxiety    • Back pain    • Depression    • Elevated blood pressure reading    • Elevated CK    • Hyporeflexia    • Rhabdomyolysis        No Known Allergies    Past Surgical History:   Procedure Laterality Date   • WISDOM TOOTH EXTRACTION         Family History   Problem Relation Age of Onset   • Diabetes Mother    • Hypertension Mother    • Stroke Mother    • Depression Maternal Uncle    • Stroke  Maternal Uncle    • Depression Maternal Grandmother    • Stroke Other    • Diabetes Other    • Cancer Other    • Diabetes Other    • Cancer Other    • Diabetes Other    • Cancer Other    • Heart failure Sister 41       Social History     Social History   • Marital status: Single     Spouse name: N/A   • Number of children: N/A   • Years of education: N/A     Social History Main Topics   • Smoking status: Never Smoker   • Smokeless tobacco: Never Used   • Alcohol use Yes      Comment: social alcohol use   • Drug use: No   • Sexual activity: Defer     Other Topics Concern   • Not on file     Social History Narrative   • No narrative on file           Objective   Physical Exam   Constitutional: He is oriented to person, place, and time. He appears well-developed and well-nourished. No distress.   HENT:   Head: Normocephalic and atraumatic.   Right Ear: External ear normal.   Left Ear: External ear normal.   Mouth/Throat: No oropharyngeal exudate.   Eyes: Conjunctivae and EOM are normal. Pupils are equal, round, and reactive to light.   Neck: Normal range of motion.   Cardiovascular: Normal rate, regular rhythm, normal heart sounds and intact distal pulses.    Pulmonary/Chest: Effort normal and breath sounds normal. No respiratory distress.   Abdominal: Soft. Bowel sounds are normal. He exhibits no distension. There is no tenderness.   Musculoskeletal: Normal range of motion.   Neurological: He is alert and oriented to person, place, and time.   Skin: Skin is warm and dry.   Psychiatric: He has a normal mood and affect. His behavior is normal.   Nursing note and vitals reviewed.      Procedures         ED Course  ED Course   Comment By Time   On repeat exam patient's abdomen is soft and nontender.  Patient reports he feels much better at this time.  I will discharge the patient home with some Zofran.  Patient encouraged to eat prior to taking his medications.  Patient agrees and verbalizes understanding. Kenya CORRAL  Earl, APRN 12/21 0209        Recent Results (from the past 24 hour(s))   Comprehensive Metabolic Panel    Collection Time: 12/21/17  1:07 AM   Result Value Ref Range    Glucose 130 (H) 70 - 100 mg/dL    BUN 13 9 - 23 mg/dL    Creatinine 1.00 0.60 - 1.30 mg/dL    Sodium 140 132 - 146 mmol/L    Potassium 4.0 3.5 - 5.5 mmol/L    Chloride 106 99 - 109 mmol/L    CO2 28.0 20.0 - 31.0 mmol/L    Calcium 9.4 8.7 - 10.4 mg/dL    Total Protein 6.9 5.7 - 8.2 g/dL    Albumin 4.10 3.20 - 4.80 g/dL    ALT (SGPT) 42 (H) 7 - 40 U/L    AST (SGOT) 39 (H) 0 - 33 U/L    Alkaline Phosphatase 63 25 - 100 U/L    Total Bilirubin 0.4 0.3 - 1.2 mg/dL    eGFR  African Amer 110 >60 mL/min/1.73    Globulin 2.8 gm/dL    A/G Ratio 1.5 1.5 - 2.5 g/dL    BUN/Creatinine Ratio 13.0 7.0 - 25.0    Anion Gap 6.0 3.0 - 11.0 mmol/L   Lipase    Collection Time: 12/21/17  1:07 AM   Result Value Ref Range    Lipase 19 6 - 51 U/L   CBC Auto Differential    Collection Time: 12/21/17  1:07 AM   Result Value Ref Range    WBC 6.37 3.50 - 10.80 10*3/mm3    RBC 4.89 4.20 - 5.76 10*6/mm3    Hemoglobin 14.7 13.1 - 17.5 g/dL    Hematocrit 43.1 38.9 - 50.9 %    MCV 88.1 80.0 - 99.0 fL    MCH 30.1 27.0 - 31.0 pg    MCHC 34.1 32.0 - 36.0 g/dL    RDW 12.7 11.3 - 14.5 %    RDW-SD 40.7 37.0 - 54.0 fl    MPV 11.2 6.0 - 12.0 fL    Platelets 162 150 - 450 10*3/mm3    Neutrophil % 64.8 41.0 - 71.0 %    Lymphocyte % 26.1 24.0 - 44.0 %    Monocyte % 8.2 0.0 - 12.0 %    Eosinophil % 0.5 0.0 - 3.0 %    Basophil % 0.2 0.0 - 1.0 %    Immature Grans % 0.2 0.0 - 0.6 %    Neutrophils, Absolute 4.14 1.50 - 8.30 10*3/mm3    Lymphocytes, Absolute 1.66 0.60 - 4.80 10*3/mm3    Monocytes, Absolute 0.52 0.00 - 1.00 10*3/mm3    Eosinophils, Absolute 0.03 0.00 - 0.30 10*3/mm3    Basophils, Absolute 0.01 0.00 - 0.20 10*3/mm3    Immature Grans, Absolute 0.01 0.00 - 0.03 10*3/mm3     Note: In addition to lab results from this visit, the labs listed above may include labs taken at another facility  "or during a different encounter within the last 24 hours. Please correlate lab times with ED admission and discharge times for further clarification of the services performed during this visit.    No orders to display     Vitals:    12/20/17 2251 12/20/17 2342   BP: 140/88 131/79   BP Location: Right arm Right arm   Patient Position: Sitting Sitting   Pulse: 65 52   Resp: 16 16   Temp: 98 °F (36.7 °C)    TempSrc: Oral    SpO2: 95% 95%   Weight: 125 kg (275 lb)    Height: 182.9 cm (72\")      Medications   sodium chloride 0.9 % flush 10 mL (not administered)   sodium chloride 0.9 % bolus 1,000 mL (1,000 mL Intravenous New Bag 12/21/17 0107)   famotidine (PEPCID) injection 20 mg (20 mg Intravenous Given 12/21/17 0106)   ondansetron (ZOFRAN) injection 4 mg (4 mg Intravenous Given 12/21/17 0106)     ECG/EMG Results (last 24 hours)     ** No results found for the last 24 hours. **                  [facility]    Final diagnoses:   Nausea and vomiting, intractability of vomiting not specified, unspecified vomiting type            Kenya Elliott, APRN  12/21/17 0211    "

## 2018-01-31 PROCEDURE — 99284 EMERGENCY DEPT VISIT MOD MDM: CPT

## 2018-02-01 ENCOUNTER — HOSPITAL ENCOUNTER (EMERGENCY)
Facility: HOSPITAL | Age: 28
Discharge: HOME | End: 2018-02-01
Attending: EMERGENCY MEDICINE | Admitting: EMERGENCY MEDICINE

## 2018-02-01 ENCOUNTER — APPOINTMENT (OUTPATIENT)
Dept: CT IMAGING | Facility: HOSPITAL | Age: 28
End: 2018-02-01

## 2018-02-01 VITALS
SYSTOLIC BLOOD PRESSURE: 119 MMHG | BODY MASS INDEX: 36.57 KG/M2 | RESPIRATION RATE: 20 BRPM | HEIGHT: 72 IN | WEIGHT: 270 LBS | TEMPERATURE: 98.3 F | HEART RATE: 88 BPM | DIASTOLIC BLOOD PRESSURE: 46 MMHG | OXYGEN SATURATION: 96 %

## 2018-02-01 DIAGNOSIS — R19.7 NAUSEA VOMITING AND DIARRHEA: ICD-10-CM

## 2018-02-01 DIAGNOSIS — R11.2 NAUSEA VOMITING AND DIARRHEA: ICD-10-CM

## 2018-02-01 DIAGNOSIS — B34.9 ACUTE VIRAL SYNDROME: Primary | ICD-10-CM

## 2018-02-01 LAB
ALBUMIN SERPL-MCNC: 4.3 G/DL (ref 3.2–4.8)
ALBUMIN/GLOB SERPL: 1.5 G/DL (ref 1.5–2.5)
ALP SERPL-CCNC: 64 U/L (ref 25–100)
ALT SERPL W P-5'-P-CCNC: 38 U/L (ref 7–40)
ANION GAP SERPL CALCULATED.3IONS-SCNC: 6 MMOL/L (ref 3–11)
AST SERPL-CCNC: 40 U/L (ref 0–33)
BASOPHILS # BLD AUTO: 0.03 10*3/MM3 (ref 0–0.2)
BASOPHILS NFR BLD AUTO: 0.4 % (ref 0–1)
BILIRUB SERPL-MCNC: 0.5 MG/DL (ref 0.3–1.2)
BILIRUB UR QL STRIP: NEGATIVE
BUN BLD-MCNC: 14 MG/DL (ref 9–23)
BUN/CREAT SERPL: 14 (ref 7–25)
CALCIUM SPEC-SCNC: 9.2 MG/DL (ref 8.7–10.4)
CHLORIDE SERPL-SCNC: 106 MMOL/L (ref 99–109)
CLARITY UR: CLEAR
CO2 SERPL-SCNC: 27 MMOL/L (ref 20–31)
COLOR UR: YELLOW
CREAT BLD-MCNC: 1 MG/DL (ref 0.6–1.3)
DEPRECATED RDW RBC AUTO: 41.5 FL (ref 37–54)
EOSINOPHIL # BLD AUTO: 0.11 10*3/MM3 (ref 0–0.3)
EOSINOPHIL NFR BLD AUTO: 1.5 % (ref 0–3)
ERYTHROCYTE [DISTWIDTH] IN BLOOD BY AUTOMATED COUNT: 13.1 % (ref 11.3–14.5)
FLUAV AG NPH QL: NEGATIVE
FLUBV AG NPH QL IA: NEGATIVE
GFR SERPL CREATININE-BSD FRML MDRD: 110 ML/MIN/1.73
GLOBULIN UR ELPH-MCNC: 2.9 GM/DL
GLUCOSE BLD-MCNC: 93 MG/DL (ref 70–100)
GLUCOSE UR STRIP-MCNC: NEGATIVE MG/DL
HCT VFR BLD AUTO: 47.1 % (ref 38.9–50.9)
HGB BLD-MCNC: 15.8 G/DL (ref 13.1–17.5)
HGB UR QL STRIP.AUTO: NEGATIVE
IMM GRANULOCYTES # BLD: 0.01 10*3/MM3 (ref 0–0.03)
IMM GRANULOCYTES NFR BLD: 0.1 % (ref 0–0.6)
KETONES UR QL STRIP: NEGATIVE
LEUKOCYTE ESTERASE UR QL STRIP.AUTO: NEGATIVE
LIPASE SERPL-CCNC: 22 U/L (ref 6–51)
LYMPHOCYTES # BLD AUTO: 3.38 10*3/MM3 (ref 0.6–4.8)
LYMPHOCYTES NFR BLD AUTO: 45.8 % (ref 24–44)
MCH RBC QN AUTO: 29.4 PG (ref 27–31)
MCHC RBC AUTO-ENTMCNC: 33.5 G/DL (ref 32–36)
MCV RBC AUTO: 87.7 FL (ref 80–99)
MONOCYTES # BLD AUTO: 0.64 10*3/MM3 (ref 0–1)
MONOCYTES NFR BLD AUTO: 8.7 % (ref 0–12)
NEUTROPHILS # BLD AUTO: 3.21 10*3/MM3 (ref 1.5–8.3)
NEUTROPHILS NFR BLD AUTO: 43.5 % (ref 41–71)
NITRITE UR QL STRIP: NEGATIVE
PH UR STRIP.AUTO: 5.5 [PH] (ref 5–8)
PLATELET # BLD AUTO: 192 10*3/MM3 (ref 150–450)
PMV BLD AUTO: 11.8 FL (ref 6–12)
POTASSIUM BLD-SCNC: 3.3 MMOL/L (ref 3.5–5.5)
PROT SERPL-MCNC: 7.2 G/DL (ref 5.7–8.2)
PROT UR QL STRIP: NEGATIVE
RBC # BLD AUTO: 5.37 10*6/MM3 (ref 4.2–5.76)
SODIUM BLD-SCNC: 139 MMOL/L (ref 132–146)
SP GR UR STRIP: 1.03 (ref 1–1.03)
UROBILINOGEN UR QL STRIP: ABNORMAL
WBC NRBC COR # BLD: 7.38 10*3/MM3 (ref 3.5–10.8)

## 2018-02-01 PROCEDURE — 83690 ASSAY OF LIPASE: CPT | Performed by: PHYSICIAN ASSISTANT

## 2018-02-01 PROCEDURE — 80053 COMPREHEN METABOLIC PANEL: CPT | Performed by: PHYSICIAN ASSISTANT

## 2018-02-01 PROCEDURE — 81003 URINALYSIS AUTO W/O SCOPE: CPT | Performed by: PHYSICIAN ASSISTANT

## 2018-02-01 PROCEDURE — 96374 THER/PROPH/DIAG INJ IV PUSH: CPT

## 2018-02-01 PROCEDURE — 87804 INFLUENZA ASSAY W/OPTIC: CPT | Performed by: EMERGENCY MEDICINE

## 2018-02-01 PROCEDURE — 96375 TX/PRO/DX INJ NEW DRUG ADDON: CPT

## 2018-02-01 PROCEDURE — 25010000002 ONDANSETRON PER 1 MG: Performed by: PHYSICIAN ASSISTANT

## 2018-02-01 PROCEDURE — 74176 CT ABD & PELVIS W/O CONTRAST: CPT

## 2018-02-01 PROCEDURE — 25010000002 KETOROLAC TROMETHAMINE PER 15 MG: Performed by: PHYSICIAN ASSISTANT

## 2018-02-01 PROCEDURE — 96361 HYDRATE IV INFUSION ADD-ON: CPT

## 2018-02-01 PROCEDURE — 85025 COMPLETE CBC W/AUTO DIFF WBC: CPT | Performed by: PHYSICIAN ASSISTANT

## 2018-02-01 RX ORDER — ONDANSETRON 2 MG/ML
4 INJECTION INTRAMUSCULAR; INTRAVENOUS ONCE
Status: COMPLETED | OUTPATIENT
Start: 2018-02-01 | End: 2018-02-01

## 2018-02-01 RX ORDER — SODIUM CHLORIDE 0.9 % (FLUSH) 0.9 %
10 SYRINGE (ML) INJECTION AS NEEDED
Status: DISCONTINUED | OUTPATIENT
Start: 2018-02-01 | End: 2018-02-01 | Stop reason: HOSPADM

## 2018-02-01 RX ORDER — ONDANSETRON 4 MG/1
4 TABLET, FILM COATED ORAL EVERY 8 HOURS PRN
Qty: 20 TABLET | Refills: 0 | Status: SHIPPED | OUTPATIENT
Start: 2018-02-01 | End: 2018-03-14

## 2018-02-01 RX ORDER — IBUPROFEN 600 MG/1
600 TABLET ORAL EVERY 6 HOURS PRN
Qty: 40 TABLET | Refills: 0 | Status: SHIPPED | OUTPATIENT
Start: 2018-02-01 | End: 2018-03-14

## 2018-02-01 RX ORDER — DOXYCYCLINE HYCLATE 100 MG/1
100 CAPSULE ORAL 2 TIMES DAILY
COMMUNITY
End: 2018-03-14

## 2018-02-01 RX ORDER — KETOROLAC TROMETHAMINE 15 MG/ML
15 INJECTION, SOLUTION INTRAMUSCULAR; INTRAVENOUS ONCE
Status: COMPLETED | OUTPATIENT
Start: 2018-02-01 | End: 2018-02-01

## 2018-02-01 RX ADMIN — ONDANSETRON HYDROCHLORIDE 4 MG: 2 INJECTION, SOLUTION INTRAMUSCULAR; INTRAVENOUS at 00:36

## 2018-02-01 RX ADMIN — KETOROLAC TROMETHAMINE 15 MG: 15 INJECTION, SOLUTION INTRAMUSCULAR; INTRAVENOUS at 00:36

## 2018-02-01 RX ADMIN — SODIUM CHLORIDE 1000 ML: 9 INJECTION, SOLUTION INTRAVENOUS at 00:35

## 2018-02-01 NOTE — DISCHARGE INSTRUCTIONS
Increase fluid intake.  Rest.  Follow-up with Dr. Rivera for recheck in 1-2 days.  Return to emergency department immediately if any change or worsening.    Diarrhea, Adult  Introduction  Diarrhea is when you have loose and water poop (stool) often. Diarrhea can make you feel weak and cause you to get dehydrated. Dehydration can make you tired and thirsty, make you have a dry mouth, and make it so you pee (urinate) less often. Diarrhea often lasts 2-3 days. However, it can last longer if it is a sign of something more serious. It is important to treat your diarrhea as told by your doctor.  Follow these instructions at home:  Eating and drinking  Follow these recommendations as told by your doctor:  · Take an oral rehydration solution (ORS). This is a drink that is sold at pharmacies and stores.  · Drink clear fluids, such as:  ¨ Water.  ¨ Ice chips.  ¨ Diluted fruit juice.  ¨ Low-calorie sports drinks.  · Eat bland, easy-to-digest foods in small amounts as you are able. These foods include:  ¨ Bananas.  ¨ Applesauce.  ¨ Rice.  ¨ Low-fat (lean) meats.  ¨ Toast.  ¨ Crackers.  · Avoid drinking fluids that have a lot of sugar or caffeine in them.  · Avoid alcohol.  · Avoid spicy or fatty foods.  General instructions  · Drink enough fluid to keep your pee (urine) clear or pale yellow.  · Wash your hands often. If you cannot use soap and water, use hand .  · Make sure that all people in your home wash their hands well and often.  · Take over-the-counter and prescription medicines only as told by your doctor.  · Rest at home while you get better.  · Watch your condition for any changes.  · Take a warm bath to help with any burning or pain from having diarrhea.  · Keep all follow-up visits as told by your doctor. This is important.  Contact a doctor if:  · You have a fever.  · Your diarrhea gets worse.  · You have new symptoms.  · You cannot keep fluids down.  · You feel light-headed or dizzy.  · You have a  headache.  · You have muscle cramps.  Get help right away if:  · You have chest pain.  · You feel very weak or you pass out (faint).  · You have bloody or black poop or poop that look like tar.  · You have very bad pain, cramping, or bloating in your belly (abdomen).  · You have trouble breathing or you are breathing very quickly.  · Your heart is beating very quickly.  · Your skin feels cold and clammy.  · You feel confused.  · You have signs of dehydration, such as:  ¨ Dark pee, hardly any pee, or no pee.  ¨ Cracked lips.  ¨ Dry mouth.  ¨ Sunken eyes.  ¨ Sleepiness.  ¨ Weakness.  This information is not intended to replace advice given to you by your health care provider. Make sure you discuss any questions you have with your health care provider.  Document Released: 06/05/2009 Document Revised: 07/07/2017 Document Reviewed: 08/23/2016  © 2017 [employer]

## 2018-02-01 NOTE — ED PROVIDER NOTES
Subjective   HPI Comments:     Britton Vogt Jr. is a 27 y.o.male who presents to the ED with complaints of flu like symptoms with onset the past few days. He states that he has been feeling under the weather for the past few days, but today, he notes his symptoms have worsened. He also reports body aches, nausea, diarrhea, chills, abdominal pain, SoA and left flank pain, but denies vomiting. There are no other known complaints at this time.       Patient is a 27 y.o. male presenting with flu symptoms.   History provided by:  Patient  Flu Symptoms   Presenting symptoms: diarrhea, nausea and shortness of breath    Presenting symptoms: no vomiting    Severity:  Moderate  Onset quality:  Sudden  Progression:  Worsening  Chronicity:  New  Relieved by:  None tried  Worsened by:  Nothing  Ineffective treatments:  None tried  Associated symptoms: chills        Review of Systems   Constitutional: Positive for chills.   Respiratory: Positive for shortness of breath.    Gastrointestinal: Positive for abdominal pain (Left sided), diarrhea and nausea. Negative for vomiting.   Genitourinary: Positive for flank pain (Left flank pain).   All other systems reviewed and are negative.      Past Medical History:   Diagnosis Date   • Anxiety    • Back pain    • Depression    • Elevated blood pressure reading    • Elevated CK    • Epididymitis    • Hyporeflexia    • Rhabdomyolysis        No Known Allergies    Past Surgical History:   Procedure Laterality Date   • WISDOM TOOTH EXTRACTION         Family History   Problem Relation Age of Onset   • Diabetes Mother    • Hypertension Mother    • Stroke Mother    • Depression Maternal Uncle    • Stroke Maternal Uncle    • Depression Maternal Grandmother    • Stroke Other    • Diabetes Other    • Cancer Other    • Diabetes Other    • Cancer Other    • Diabetes Other    • Cancer Other    • Heart failure Sister 41       Social History     Social History   • Marital status: Single     Spouse  name: N/A   • Number of children: N/A   • Years of education: N/A     Social History Main Topics   • Smoking status: Never Smoker   • Smokeless tobacco: Never Used   • Alcohol use Yes      Comment: social alcohol use   • Drug use: No   • Sexual activity: Defer     Other Topics Concern   • None     Social History Narrative   • None         Objective   Physical Exam   Constitutional: He is oriented to person, place, and time. He appears well-developed and well-nourished.   Non-toxic appearing.    HENT:   Head: Normocephalic and atraumatic.   Nose: Nose normal.   Eyes: Conjunctivae are normal. No scleral icterus.   Neck: Normal range of motion. Neck supple.   Cardiovascular: Normal rate and regular rhythm.    Pulmonary/Chest: Effort normal and breath sounds normal. No respiratory distress.   Abdominal: Soft. There is tenderness (Mild LUQ tenderness).   Musculoskeletal: Normal range of motion. He exhibits no tenderness.   Neurological: He is alert and oriented to person, place, and time.   Skin: Skin is warm and dry.   Psychiatric: He has a normal mood and affect. His behavior is normal.   Nursing note and vitals reviewed.      Procedures         ED Course  ED Course       Recent Results (from the past 24 hour(s))   Influenza Antigen, Rapid - Swab, Nasopharynx    Collection Time: 02/01/18 12:08 AM   Result Value Ref Range    Influenza A Ag, EIA Negative Negative    Influenza B Ag, EIA Negative Negative   Comprehensive Metabolic Panel    Collection Time: 02/01/18 12:35 AM   Result Value Ref Range    Glucose 93 70 - 100 mg/dL    BUN 14 9 - 23 mg/dL    Creatinine 1.00 0.60 - 1.30 mg/dL    Sodium 139 132 - 146 mmol/L    Potassium 3.3 (L) 3.5 - 5.5 mmol/L    Chloride 106 99 - 109 mmol/L    CO2 27.0 20.0 - 31.0 mmol/L    Calcium 9.2 8.7 - 10.4 mg/dL    Total Protein 7.2 5.7 - 8.2 g/dL    Albumin 4.30 3.20 - 4.80 g/dL    ALT (SGPT) 38 7 - 40 U/L    AST (SGOT) 40 (H) 0 - 33 U/L    Alkaline Phosphatase 64 25 - 100 U/L    Total  Bilirubin 0.5 0.3 - 1.2 mg/dL    eGFR  African Amer 110 >60 mL/min/1.73    Globulin 2.9 gm/dL    A/G Ratio 1.5 1.5 - 2.5 g/dL    BUN/Creatinine Ratio 14.0 7.0 - 25.0    Anion Gap 6.0 3.0 - 11.0 mmol/L   Lipase    Collection Time: 02/01/18 12:35 AM   Result Value Ref Range    Lipase 22 6 - 51 U/L   Urinalysis With / Culture If Indicated - Urine, Clean Catch    Collection Time: 02/01/18 12:35 AM   Result Value Ref Range    Color, UA Yellow Yellow, Straw    Appearance, UA Clear Clear    pH, UA 5.5 5.0 - 8.0    Specific Gravity, UA 1.033 (H) 1.001 - 1.030    Glucose, UA Negative Negative    Ketones, UA Negative Negative    Bilirubin, UA Negative Negative    Blood, UA Negative Negative    Protein, UA Negative Negative    Leuk Esterase, UA Negative Negative    Nitrite, UA Negative Negative    Urobilinogen, UA 0.2 E.U./dL 0.2 - 1.0 E.U./dL   CBC Auto Differential    Collection Time: 02/01/18 12:35 AM   Result Value Ref Range    WBC 7.38 3.50 - 10.80 10*3/mm3    RBC 5.37 4.20 - 5.76 10*6/mm3    Hemoglobin 15.8 13.1 - 17.5 g/dL    Hematocrit 47.1 38.9 - 50.9 %    MCV 87.7 80.0 - 99.0 fL    MCH 29.4 27.0 - 31.0 pg    MCHC 33.5 32.0 - 36.0 g/dL    RDW 13.1 11.3 - 14.5 %    RDW-SD 41.5 37.0 - 54.0 fl    MPV 11.8 6.0 - 12.0 fL    Platelets 192 150 - 450 10*3/mm3    Neutrophil % 43.5 41.0 - 71.0 %    Lymphocyte % 45.8 (H) 24.0 - 44.0 %    Monocyte % 8.7 0.0 - 12.0 %    Eosinophil % 1.5 0.0 - 3.0 %    Basophil % 0.4 0.0 - 1.0 %    Immature Grans % 0.1 0.0 - 0.6 %    Neutrophils, Absolute 3.21 1.50 - 8.30 10*3/mm3    Lymphocytes, Absolute 3.38 0.60 - 4.80 10*3/mm3    Monocytes, Absolute 0.64 0.00 - 1.00 10*3/mm3    Eosinophils, Absolute 0.11 0.00 - 0.30 10*3/mm3    Basophils, Absolute 0.03 0.00 - 0.20 10*3/mm3    Immature Grans, Absolute 0.01 0.00 - 0.03 10*3/mm3     Note: In addition to lab results from this visit, the labs listed above may include labs taken at another facility or during a different encounter within the last 24  hours. Please correlate lab times with ED admission and discharge times for further clarification of the services performed during this visit.    CT Abdomen Pelvis Without Contrast   ED Interpretation      1. No acute findings.      2. Non-acute findings are described above.      THIS DOCUMENT HAS BEEN ELECTRONICALLY SIGNED BY CA KING MD      Final Result      1. No acute findings.      2. Non-acute findings are described above.      THIS DOCUMENT HAS BEEN ELECTRONICALLY SIGNED BY CA KING MD        Vitals:    01/31/18 2357 02/01/18 0003 02/01/18 0030 02/01/18 0153   BP: 127/72 150/88 126/81 121/65   BP Location: Left arm      Patient Position: Sitting      Pulse: 88      Resp: 20      Temp: 98.3 °F (36.8 °C)      TempSrc: Oral      SpO2: 97%  95% 96%   Weight:       Height:         Medications   sodium chloride 0.9 % flush 10 mL (not administered)   sodium chloride 0.9 % bolus 1,000 mL (0 mL Intravenous Stopped 2/1/18 0135)   ketorolac (TORADOL) injection 15 mg (15 mg Intravenous Given 2/1/18 0036)   ondansetron (ZOFRAN) injection 4 mg (4 mg Intravenous Given 2/1/18 0036)     ECG/EMG Results (last 24 hours)     ** No results found for the last 24 hours. **                      [facility]    Final diagnoses:   Acute viral syndrome   Nausea vomiting and diarrhea       Documentation assistance provided by radha Bernal.  Information recorded by the radha was done at my direction and has been verified and validated by me.     Hector Bernal  02/01/18 0008       Gorge Viveros PA-C  02/01/18 0228

## 2018-02-02 ENCOUNTER — OFFICE VISIT (OUTPATIENT)
Dept: INTERNAL MEDICINE | Facility: CLINIC | Age: 28
End: 2018-02-02

## 2018-02-02 VITALS
TEMPERATURE: 98.1 F | WEIGHT: 279.25 LBS | DIASTOLIC BLOOD PRESSURE: 68 MMHG | HEART RATE: 60 BPM | BODY MASS INDEX: 37.82 KG/M2 | OXYGEN SATURATION: 97 % | RESPIRATION RATE: 16 BRPM | SYSTOLIC BLOOD PRESSURE: 108 MMHG | HEIGHT: 72 IN

## 2018-02-02 DIAGNOSIS — K57.90 DIVERTICULOSIS OF INTESTINE WITHOUT BLEEDING, UNSPECIFIED INTESTINAL TRACT LOCATION: ICD-10-CM

## 2018-02-02 DIAGNOSIS — R10.12 LEFT UPPER QUADRANT PAIN: Primary | ICD-10-CM

## 2018-02-02 PROCEDURE — 99214 OFFICE O/P EST MOD 30 MIN: CPT | Performed by: NURSE PRACTITIONER

## 2018-02-02 RX ORDER — ESOMEPRAZOLE MAGNESIUM 20 MG/1
20 TABLET, DELAYED RELEASE ORAL DAILY
Qty: 14 TABLET | Refills: 0 | COMMUNITY
Start: 2018-02-02 | End: 2018-02-16

## 2018-02-02 RX ORDER — DOXYCYCLINE 100 MG/1
CAPSULE ORAL
Refills: 0 | COMMUNITY
Start: 2018-01-24 | End: 2018-03-14

## 2018-02-02 NOTE — PATIENT INSTRUCTIONS
Diverticulosis  Diverticulosis is the condition that develops when small pouches (diverticula) form in the wall of your colon. Your colon, or large intestine, is where water is absorbed and stool is formed. The pouches form when the inside layer of your colon pushes through weak spots in the outer layers of your colon.  CAUSES   No one knows exactly what causes diverticulosis.  RISK FACTORS  · Being older than 50. Your risk for this condition increases with age. Diverticulosis is rare in people younger than 40 years. By age 80, almost everyone has it.  · Eating a low-fiber diet.  · Being frequently constipated.  · Being overweight.  · Not getting enough exercise.  · Smoking.  · Taking over-the-counter pain medicines, like aspirin and ibuprofen.  SYMPTOMS   Most people with diverticulosis do not have symptoms.  DIAGNOSIS   Because diverticulosis often has no symptoms, health care providers often discover the condition during an exam for other colon problems. In many cases, a health care provider will diagnose diverticulosis while using a flexible scope to examine the colon (colonoscopy).  TREATMENT   If you have never developed an infection related to diverticulosis, you may not need treatment. If you have had an infection before, treatment may include:  · Eating more fruits, vegetables, and grains.  · Taking a fiber supplement.  · Taking a live bacteria supplement (probiotic).  · Taking medicine to relax your colon.  HOME CARE INSTRUCTIONS   · Drink at least 6-8 glasses of water each day to prevent constipation.  · Try not to strain when you have a bowel movement.  · Keep all follow-up appointments.  If you have had an infection before:   · Increase the fiber in your diet as directed by your health care provider or dietitian.  · Take a dietary fiber supplement if your health care provider approves.  · Only take medicines as directed by your health care provider.  SEEK MEDICAL CARE IF:   · You have abdominal  pain.  · You have bloating.  · You have cramps.  · You have not gone to the bathroom in 3 days.  SEEK IMMEDIATE MEDICAL CARE IF:   · Your pain gets worse.  · Your bloating becomes very bad.  · You have a fever or chills, and your symptoms suddenly get worse.  · You begin vomiting.  · You have bowel movements that are bloody or black.  MAKE SURE YOU:  · Understand these instructions.  · Will watch your condition.  · Will get help right away if you are not doing well or get worse.  This information is not intended to replace advice given to you by your health care provider. Make sure you discuss any questions you have with your health care provider.  Document Released: 09/14/2005 Document Revised: 12/23/2014 Document Reviewed: 11/12/2014  [employer] Interactive Patient Education © 2017 [employer]    Diverticulitis  Diverticulitis is when small pockets that have formed in your colon (large intestine) become infected or swollen.  Follow these instructions at home:  · Follow your doctor's instructions.  · Follow a special diet if told by your doctor.  · When you feel better, your doctor may tell you to change your diet. You may be told to eat a lot of fiber. Fruits and vegetables are good sources of fiber. Fiber makes it easier to poop (have bowel movements).  · Take supplements or probiotics as told by your doctor.  · Only take medicines as told by your doctor.  · Keep all follow-up visits with your doctor.  Contact a doctor if:  · Your pain does not get better.  · You have a hard time eating food.  · You are not pooping like normal.  Get help right away if:  · Your pain gets worse.  · Your problems do not get better.  · Your problems suddenly get worse.  · You have a fever.  · You keep throwing up (vomiting).  · You have bloody or black, tarry poop (stool).  This information is not intended to replace advice given to you by your health care provider. Make sure you discuss any questions you have with your health care  provider.  Document Released: 06/05/2009 Document Revised: 05/25/2017 Document Reviewed: 11/12/2014  [employer] Interactive Patient Education © 2017 [employer].    Diverticulitis  Diverticulitis is when small pockets that have formed in your colon (large intestine) become infected or swollen.  Follow these instructions at home:  · Follow your doctor's instructions.  · Follow a special diet if told by your doctor.  · When you feel better, your doctor may tell you to change your diet. You may be told to eat a lot of fiber. Fruits and vegetables are good sources of fiber. Fiber makes it easier to poop (have bowel movements).  · Take supplements or probiotics as told by your doctor.  · Only take medicines as told by your doctor.  · Keep all follow-up visits with your doctor.  Contact a doctor if:  · Your pain does not get better.  · You have a hard time eating food.  · You are not pooping like normal.  Get help right away if:  · Your pain gets worse.  · Your problems do not get better.  · Your problems suddenly get worse.  · You have a fever.  · You keep throwing up (vomiting).  · You have bloody or black, tarry poop (stool).  This information is not intended to replace advice given to you by your health care provider. Make sure you discuss any questions you have with your health care provider.  Document Released: 06/05/2009 Document Revised: 05/25/2017 Document Reviewed: 11/12/2014  [employer] Patient Education © 2017 [employer] Inc.

## 2018-02-05 ENCOUNTER — TELEPHONE (OUTPATIENT)
Dept: INTERNAL MEDICINE | Facility: CLINIC | Age: 28
End: 2018-02-05

## 2018-02-05 NOTE — TELEPHONE ENCOUNTER
Notified pt. that an order has been placed for an h.pylori test to do if not feeling better and would like to have this done.

## 2018-02-06 NOTE — PROGRESS NOTES
Chief Complaint / Reason:      Chief Complaint   Patient presents with   • Abdominal Pain     ER f/u-[facility], stomach pain started about 2 1/2 mos. ago.        Subjective     HPI  Patient presents today for ER follow-up regarding [facility] and states that he went on to/1/18 complaining of flulike symptoms which included body aches, nausea, diarrhea, chills and abdominal plain.  He stated he was short of breath and had left flank pain.  He denies any vomiting or blood in stool or urine.  He states he is doing a little bit better but he still has left sided pain.  A CT scan and labs were performed in the ER which revealed minimal scattered colonic diverticulosis and low potassium.  Otherwise clinically results were unremarkable.  Patient was also negative for the flu.  She states he was unaware of the diverticulosis and diet was discussed and advised him on what foods to avoid and how to prevent further complications.  History taken from: patient    PMH/FH/Social History were reviewed and updated appropriately in the electronic medical record.     Review of Systems:   Review of Systems   Constitutional: Positive for fatigue.   Respiratory: Negative.    Cardiovascular: Negative.    Gastrointestinal: Positive for abdominal pain.   Skin: Negative.      All other systems were reviewed and are negative.  Exceptions are noted in the subjective or above.      Objective     Vital Signs  Vitals:    02/02/18 1700   BP: 108/68   Pulse: 60   Resp: 16   Temp: 98.1 °F (36.7 °C)   SpO2: 97%       Body mass index is 37.87 kg/(m^2).    Physical Exam   Constitutional: He is oriented to person, place, and time. He appears well-developed and well-nourished.   Cardiovascular: Normal rate, regular rhythm, normal heart sounds and intact distal pulses.    Pulmonary/Chest: Effort normal and breath sounds normal. He exhibits no tenderness.   Abdominal: Soft. Bowel sounds are normal. There is tenderness in the left upper quadrant  and left lower quadrant.   Neurological: He is alert and oriented to person, place, and time.   Skin: Skin is warm and dry.   Psychiatric: He has a normal mood and affect. His behavior is normal. Judgment and thought content normal.   Nursing note and vitals reviewed.       Results Review:    I reviewed the patient's new clinical results.   Results for orders placed during the hospital encounter of 02/01/18   CT Abdomen Pelvis Without Contrast    Narrative EXAM:  CT Abdomen and Pelvis Without Intravenous Contrast    CLINICAL HISTORY:  27 years old, male; Pain; Abdominal pain; Localized; Left; Additional info: Llq   abd pain    TECHNIQUE:  Axial computed tomography images of the abdomen and pelvis without intravenous   contrast.  All CT scans at this facility use one or more dose reduction   techniques, viz.: automated exposure control; ma/kV adjustment per patient size   (including targeted exams where dose is matched to indication; i.e. head); or   iterative reconstruction technique.  Coronal reformatted images were created and reviewed.    COMPARISON:  US ABDOMEN LIMITED 2017-12-15 14:25    FINDINGS:  Lower thorax: No acute findings.    ABDOMEN:  Liver:  Normal.  Gallbladder and bile ducts:  Normal.  Pancreas:  Normal.  Spleen:  Normal.  Adrenals:  Normal.  Kidneys and ureters:  Normal.  Stomach and bowel:  Minimal scattered colonic diverticulosis.  Appendix:  Appendix is normal.    PELVIS:  Bladder:  Normal.  Reproductive:  Normal as visualized.    ABDOMEN and PELVIS:  Intraperitoneal space:  Normal.  No free air.  No significant fluid collection.  Bones/joints:  No acute fracture.  No dislocation.  Soft tissues:  Normal.  Vasculature:  Phleboliths within the pelvis.  No abdominal aortic aneurysm.  Lymph nodes:  Normal.      Impression 1. No acute findings.    2. Non-acute findings are described above.    THIS DOCUMENT HAS BEEN ELECTRONICALLY SIGNED BY CA KING MD     Labs also reviewed and discussed with  patient.      Medication Review:     Current Outpatient Prescriptions:   •  doxycycline (MONODOX) 100 MG capsule, TAKE 1 CAPSULE BY MOUTH TWICE DAILY, Disp: , Rfl: 0  •  doxycycline (VIBRAMYCIN) 100 MG capsule, Take 100 mg by mouth 2 (Two) Times a Day., Disp: , Rfl:   •  ibuprofen (ADVIL,MOTRIN) 600 MG tablet, Take 1 tablet by mouth Every 6 (Six) Hours As Needed for Mild Pain ., Disp: 40 tablet, Rfl: 0  •  ondansetron (ZOFRAN) 4 MG tablet, Take 1 tablet by mouth Every 8 (Eight) Hours As Needed for Nausea or Vomiting., Disp: 20 tablet, Rfl: 0  •  Esomeprazole Magnesium 20 MG tablet delayed-release, Take 20 mg by mouth Daily for 14 doses., Disp: 14 tablet, Rfl: 0    Assessment/Plan   Britton was seen today for abdominal pain.    Diagnoses and all orders for this visit:    Left upper quadrant pain  -     Esomeprazole Magnesium 20 MG tablet delayed-release; Take 20 mg by mouth Daily for 14 doses.  -     Helicobacter Pylori, IgA IgG IgM    Diverticulosis of intestine without bleeding, unspecified intestinal tract location  -     Esomeprazole Magnesium 20 MG tablet delayed-release; Take 20 mg by mouth Daily for 14 doses.    Discussed dietary modifications with patient    Return in about 4 weeks (around 3/2/2018), or if symptoms worsen or fail to improve.    Tila Bright, APRN  02/02/2018

## 2018-02-09 LAB
H PYLORI IGA SER-ACNC: <9 UNITS (ref 0–8.9)
H PYLORI IGG SER IA-ACNC: 0.19 INDEX VALUE (ref 0–0.79)
H PYLORI IGM SER-ACNC: <9 UNITS (ref 0–8.9)

## 2018-03-14 ENCOUNTER — OFFICE VISIT (OUTPATIENT)
Dept: CARDIOLOGY | Facility: CLINIC | Age: 28
End: 2018-03-14

## 2018-03-14 VITALS
BODY MASS INDEX: 38.19 KG/M2 | WEIGHT: 282 LBS | SYSTOLIC BLOOD PRESSURE: 118 MMHG | HEART RATE: 78 BPM | DIASTOLIC BLOOD PRESSURE: 80 MMHG | HEIGHT: 72 IN

## 2018-03-14 DIAGNOSIS — E66.9 OBESITY WITHOUT SERIOUS COMORBIDITY, UNSPECIFIED CLASSIFICATION, UNSPECIFIED OBESITY TYPE: ICD-10-CM

## 2018-03-14 DIAGNOSIS — R07.2 PRECORDIAL PAIN: ICD-10-CM

## 2018-03-14 DIAGNOSIS — R00.2 PALPITATION: Primary | ICD-10-CM

## 2018-03-14 PROCEDURE — 99213 OFFICE O/P EST LOW 20 MIN: CPT | Performed by: INTERNAL MEDICINE

## 2018-03-14 NOTE — PROGRESS NOTES
[facility]     Britton Vogt Jr.  1990      VISIT DATE:  3/14/2018     PCP: Eliza Mayberry MD  [address] [place] [id number]    IDENTIFICATION: A 27 y.o. male from [facility], works at the [employer].  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 Palpitations  1. 9/4/17 Zio: Ave HR 72, range , <1% SVE, <1% VE,   2. Overnight pulseox monitoring wnl 2017  2. Migraine headaches  3. Back Pain    Allergies  No Known Allergies    Current Medications  No current outpatient prescriptions on file.     History of Present Illness   HPI  Pt here for follow up after a nl Zio and overnight pulse ox test. He continues to have occasional CP and dyspnea w some cough. Pt denies orthopnea, lower extremity edema, or claudication. Pt denies history of CHF, DVT, PE, MI, CVA, TIA, or rheumatic fever. Pt gained 20 lbs since his last visit, endorses drinking a lot of Gatorade. He reports he had an echo 2-3 years ago w [facility] in [place], [place], his hometown.       ROS  Review of Systems   Constitution: Positive for malaise/fatigue.   Cardiovascular: Positive for palpitations.   Neurological: Positive for headaches.   Psychiatric/Behavioral: The patient is nervous/anxious.    All other systems reviewed and are negative.      SOCIAL HX  Social History     Social History   • Marital status: Single     Spouse name: N/A   • Number of children: N/A   • Years of education: N/A     Occupational History   • Not on file.     Social History Main Topics   • Smoking status: Never Smoker   • Smokeless tobacco: Never Used   • Alcohol use Yes      Comment: social alcohol use   • Drug use: No   • Sexual activity: Defer     Other Topics Concern   • Not on file     Social History Narrative   • No narrative on file       FAMILY HX  Family History   Problem Relation Age of Onset  "  • Diabetes Mother    • Hypertension Mother    • Stroke Mother    • Depression Maternal Uncle    • Stroke Maternal Uncle    • Depression Maternal Grandmother    • Stroke Other    • Diabetes Other    • Cancer Other    • Diabetes Other    • Cancer Other    • Diabetes Other    • Cancer Other    • Heart failure Sister 41       Vitals:    03/14/18 1313   BP: 118/80   BP Location: Right arm   Patient Position: Sitting   Pulse: 78   Weight: 128 kg (282 lb)   Height: 182.9 cm (72\")       PHYSICAL EXAMINATION:  Physical Exam   Constitutional: He is oriented to person, place, and time. He appears well-developed and well-nourished. No distress.   HENT:   Head: Normocephalic and atraumatic.   Right Ear: External ear normal.   Left Ear: External ear normal.   Nose: Nose normal.   Eyes: Conjunctivae and EOM are normal.   Neck: Neck supple. No hepatojugular reflux and no JVD present. Carotid bruit is not present. No thyromegaly present.   Cardiovascular: Normal rate, regular rhythm, S1 normal, S2 normal, normal heart sounds, intact distal pulses and normal pulses.  Exam reveals no gallop, no distant heart sounds and no midsystolic click.    No murmur heard.  Pulses:       Radial pulses are 2+ on the right side, and 2+ on the left side.        Dorsalis pedis pulses are 2+ on the right side, and 2+ on the left side.        Posterior tibial pulses are 2+ on the right side, and 2+ on the left side.   Pulmonary/Chest: Effort normal and breath sounds normal. No respiratory distress. He has no decreased breath sounds. He has no wheezes. He has no rhonchi. He has no rales.   Abdominal: Soft. Bowel sounds are normal. There is no hepatosplenomegaly. There is no tenderness.   Musculoskeletal: Normal range of motion. He exhibits no edema.   Neurological: He is alert and oriented to person, place, and time.   No focal deficits.   Skin: Skin is warm and dry. No erythema.   Psychiatric: He has a normal mood and affect. Thought content normal. "   Nursing note and vitals reviewed.      Diagnostic Data:  Procedures    Lab Results   Component Value Date    GLUCOSE 93 02/01/2018    BUN 14 02/01/2018    CREATININE 1.00 02/01/2018     02/01/2018    K 3.3 (L) 02/01/2018     02/01/2018    CO2 27.0 02/01/2018       Lab Results   Component Value Date    WBC 7.38 02/01/2018    HGB 15.8 02/01/2018    HCT 47.1 02/01/2018     02/01/2018       ASSESSMENT:   Diagnosis Plan   1. Palpitation  Adult Transthoracic Echo Complete W/ Cont if Necessary Per Protocol   2. Precordial pain  Adult Transthoracic Echo Complete W/ Cont if Necessary Per Protocol   3. Obesity without serious comorbidity, unspecified classification, unspecified obesity type           PLAN:  1. We will work to obtain results of previous echo, pt advised to give us a call with where he had the echo done. As it has been several years we will check a new echocardiogram and compare w previous  2. Pt counseled that regular exercise to show the heart regular stress tone can help w palpitations as well as weight management  3. Pt counseled on limiting carb rich foods especially sugary beverages. Especially w family hx of DM, pt counseled on the importance of avoiding that diagnosis  4. Advised to consider GI work up if symptoms persist w further normal cardiac w/u    Scribed for Ray Dunbar MD by Hafsa Rankin PA-C. 3/14/2018  1:57 PM  I, Ray Dunbar MD, personally performed the services described in this documentation as scribed by the above named individual in my presence, and it is both accurate and complete.  3/14/2018  1:57 PM    Ray Dunbar MD, [facility]

## 2018-04-09 ENCOUNTER — HOSPITAL ENCOUNTER (OUTPATIENT)
Dept: CARDIOLOGY | Facility: HOSPITAL | Age: 28
Discharge: HOME | End: 2018-04-09
Admitting: PHYSICIAN ASSISTANT

## 2018-04-09 DIAGNOSIS — R00.2 PALPITATION: ICD-10-CM

## 2018-04-09 DIAGNOSIS — R07.2 PRECORDIAL PAIN: ICD-10-CM

## 2018-04-09 LAB
BH CV ECHO MEAS - AO ROOT AREA (BSA CORRECTED): 1.1
BH CV ECHO MEAS - AO ROOT AREA: 5.9 CM^2
BH CV ECHO MEAS - AO ROOT DIAM: 2.7 CM
BH CV ECHO MEAS - BSA(HAYCOCK): 2.6 M^2
BH CV ECHO MEAS - BSA: 2.5 M^2
BH CV ECHO MEAS - BZI_BMI: 38.2 KILOGRAMS/M^2
BH CV ECHO MEAS - BZI_METRIC_HEIGHT: 182.9 CM
BH CV ECHO MEAS - BZI_METRIC_WEIGHT: 127.9 KG
BH CV ECHO MEAS - EDV(CUBED): 151.2 ML
BH CV ECHO MEAS - EDV(TEICH): 137 ML
BH CV ECHO MEAS - EF(CUBED): 82.1 %
BH CV ECHO MEAS - EF(TEICH): 74.4 %
BH CV ECHO MEAS - ESV(CUBED): 27 ML
BH CV ECHO MEAS - ESV(TEICH): 35 ML
BH CV ECHO MEAS - FS: 43.7 %
BH CV ECHO MEAS - IVS/LVPW: 0.99
BH CV ECHO MEAS - IVSD: 1 CM
BH CV ECHO MEAS - LA DIMENSION: 3.9 CM
BH CV ECHO MEAS - LA/AO: 1.4
BH CV ECHO MEAS - LV MASS(C)D: 214.4 GRAMS
BH CV ECHO MEAS - LV MASS(C)DI: 87 GRAMS/M^2
BH CV ECHO MEAS - LVIDD: 5.3 CM
BH CV ECHO MEAS - LVIDS: 3 CM
BH CV ECHO MEAS - LVPWD: 1.1 CM
BH CV ECHO MEAS - MV A MAX VEL: 52.3 CM/SEC
BH CV ECHO MEAS - MV DEC SLOPE: 442.2 CM/SEC^2
BH CV ECHO MEAS - MV DEC TIME: 0.24 SEC
BH CV ECHO MEAS - MV E MAX VEL: 84.4 CM/SEC
BH CV ECHO MEAS - MV E/A: 1.6
BH CV ECHO MEAS - MV P1/2T MAX VEL: 99.1 CM/SEC
BH CV ECHO MEAS - MV P1/2T: 65.6 MSEC
BH CV ECHO MEAS - MVA P1/2T LCG: 2.2 CM^2
BH CV ECHO MEAS - MVA(P1/2T): 3.4 CM^2
BH CV ECHO MEAS - PA ACC SLOPE: 644.2 CM/SEC^2
BH CV ECHO MEAS - PA ACC TIME: 0.15 SEC
BH CV ECHO MEAS - PA PR(ACCEL): 12.5 MMHG
BH CV ECHO MEAS - RV MAX PG: 1.4 MMHG
BH CV ECHO MEAS - RV V1 MAX: 59.7 CM/SEC
BH CV ECHO MEAS - SI(CUBED): 50.4 ML/M^2
BH CV ECHO MEAS - SI(TEICH): 41.4 ML/M^2
BH CV ECHO MEAS - SV(CUBED): 124.2 ML
BH CV ECHO MEAS - SV(TEICH): 102 ML
BH CV ECHO MEAS - TAPSE (>1.6): 2.9 CM2
BH CV XLRA - RV BASE: 4.1 CM
BH CV XLRA - RV LENGTH: 9.4 CM
BH CV XLRA - RV MID: 3.8 CM
BH CV XLRA - TDI S': 16.1 CM/SEC
LEFT ATRIUM VOLUME INDEX: 13.8 ML/M2
LEFT ATRIUM VOLUME: 34 CM3
LV EF 2D ECHO EST: 60 %

## 2018-04-09 PROCEDURE — 93306 TTE W/DOPPLER COMPLETE: CPT

## 2018-04-09 PROCEDURE — 93306 TTE W/DOPPLER COMPLETE: CPT | Performed by: INTERNAL MEDICINE

## 2018-04-23 ENCOUNTER — OFFICE VISIT (OUTPATIENT)
Dept: INTERNAL MEDICINE | Facility: CLINIC | Age: 28
End: 2018-04-23

## 2018-04-23 VITALS
SYSTOLIC BLOOD PRESSURE: 122 MMHG | RESPIRATION RATE: 14 BRPM | DIASTOLIC BLOOD PRESSURE: 80 MMHG | OXYGEN SATURATION: 98 % | TEMPERATURE: 97.6 F | BODY MASS INDEX: 38.33 KG/M2 | WEIGHT: 283 LBS | HEART RATE: 76 BPM | HEIGHT: 72 IN

## 2018-04-23 DIAGNOSIS — H61.23 BILATERAL IMPACTED CERUMEN: ICD-10-CM

## 2018-04-23 DIAGNOSIS — H92.03 OTALGIA OF BOTH EARS: Primary | ICD-10-CM

## 2018-04-23 PROCEDURE — 99213 OFFICE O/P EST LOW 20 MIN: CPT | Performed by: FAMILY MEDICINE

## 2018-04-23 PROCEDURE — 69210 REMOVE IMPACTED EAR WAX UNI: CPT | Performed by: FAMILY MEDICINE

## 2018-04-23 NOTE — PATIENT INSTRUCTIONS
Eustachian Tube Dysfunction  The eustachian tube connects the middle ear to the back of the nose. It regulates air pressure in the middle ear by allowing air to move between the ear and nose. It also helps to drain fluid from the middle ear space. When the eustachian tube does not function properly, air pressure, fluid, or both can build up in the middle ear.  Eustachian tube dysfunction can affect one or both ears.  What are the causes?  This condition happens when the eustachian tube becomes blocked or cannot open normally. This may result from:  · Ear infections.  · Colds and other upper respiratory infections.  · Allergies.  · Irritation, such as from cigarette smoke or acid from the stomach coming up into the esophagus (gastroesophageal reflux).  · Sudden changes in air pressure, such as from descending in an airplane.  · Abnormal growths in the nose or throat, such as nasal polyps, tumors, or enlarged tissue at the back of the throat (adenoids).  What increases the risk?  This condition may be more likely to develop in people who smoke and people who are overweight. Eustachian tube dysfunction may also be more likely to develop in children, especially children who have:  · Certain birth defects of the mouth, such as cleft palate.  · Large tonsils and adenoids.  What are the signs or symptoms?  Symptoms of this condition may include:  · A feeling of fullness in the ear.  · Ear pain.  · Clicking or popping noises in the ear.  · Ringing in the ear.  · Hearing loss.  · Loss of balance.  Symptoms may get worse when the air pressure around you changes, such as when you travel to an area of high elevation or fly on an airplane.  How is this diagnosed?  This condition may be diagnosed based on:  · Your symptoms.  · A physical exam of your ear, nose, and throat.  · Tests, such as those that measure:  ¨ The movement of your eardrum (tympanogram).  ¨ Your hearing (audiometry).  How is this treated?  Treatment depends on  "the cause and severity of your condition. If your symptoms are mild, you may be able to relieve your symptoms by moving air into (\"popping\") your ears. If you have symptoms of fluid in your ears, treatment may include:  · Decongestants.  · Antihistamines.  · Nasal sprays or ear drops that contain medicines that reduce swelling (steroids).  In some cases, you may need to have a procedure to drain the fluid in your eardrum (myringotomy). In this procedure, a small tube is placed in the eardrum to:  · Drain the fluid.  · Restore the air in the middle ear space.  Follow these instructions at home:  · Take over-the-counter and prescription medicines only as told by your health care provider.  · Use techniques to help pop your ears as recommended by your health care provider. These may include:  ¨ Chewing gum.  ¨ Yawning.  ¨ Frequent, forceful swallowing.  ¨ Closing your mouth, holding your nose closed, and gently blowing as if you are trying to blow air out of your nose.  · Do not do any of the following until your health care provider approves:  ¨ Travel to high altitudes.  ¨ Fly in airplanes.  ¨ Work in a pressurized cabin or room.  ¨ Scuba dive.  · Keep your ears dry. Dry your ears completely after showering or bathing.  · Do not smoke.  · Keep all follow-up visits as told by your health care provider. This is important.  Contact a health care provider if:  · Your symptoms do not go away after treatment.  · Your symptoms come back after treatment.  · You are unable to pop your ears.  · You have:  ¨ A fever.  ¨ Pain in your ear.  ¨ Pain in your head or neck.  ¨ Fluid draining from your ear.  · Your hearing suddenly changes.  · You become very dizzy.  · You lose your balance.  This information is not intended to replace advice given to you by your health care provider. Make sure you discuss any questions you have with your health care provider.  Document Released: 01/13/2017 Document Revised: 05/25/2017 Document " Reviewed: 01/06/2016  [employer] Interactive Patient Education © 2017 [employer].

## 2018-04-23 NOTE — PROGRESS NOTES
"Subjective    Britton Vogt Jr. is a 27 y.o. male here for:  Chief Complaint   Patient presents with   • Earache     comes and goes, causes headaches     History of Present Illness     Has had ear issues off on for months but worse in last week. Hearing seems \"cloudy\". Mainly pain in right ear, occasional pain on left.     The following portions of the patient's history were reviewed and updated as appropriate: allergies, current medications, past family history, past medical history, past social history, past surgical history and problem list.    Review of Systems   HENT: Negative for dental problem.        Vitals:    04/23/18 1451   BP: 122/80   Pulse: 76   Resp: 14   Temp: 97.6 °F (36.4 °C)   SpO2: 98%   Weight: 128 kg (283 lb)   Height: 182.9 cm (72.01\")         Objective     Physical Exam   Constitutional: He is oriented to person, place, and time. Vital signs are normal. He appears well-developed and well-nourished. He is active. He does not appear ill. No distress.   HENT:   Head: Normocephalic and atraumatic.   Right Ear: External ear normal. cerumen impaction is present.  Left Ear: External ear normal. An impacted cerumen is present.  Cerumen impactions resolved and normal TMs revealed   Eyes: EOM are normal.   Neck: Neck supple.   Pulmonary/Chest: Effort normal.   Neurological: He is alert and oriented to person, place, and time. No cranial nerve deficit.   Skin: Skin is warm. He is not diaphoretic.   Psychiatric: He has a normal mood and affect. His behavior is normal.   Nursing note and vitals reviewed.    Ear Cerumen Removal Instrumentation  Date/Time: 4/23/2018 5:05 PM  Performed by: IFEANYI MOREL  Authorized by: IFEANYI MOREL   Consent: Verbal consent obtained.  Risks and benefits: risks, benefits and alternatives were discussed  Consent given by: patient  Patient understanding: patient states understanding of the procedure being performed    Anesthesia:  Local Anesthetic: " none  Ceruminolytics applied: Ceruminolytics applied prior to the procedure.  Location details: left ear and right ear  Patient tolerance: Patient tolerated the procedure well with no immediate complications  Comments: Left ear cerumen was removed via curette. On right irrigation resolved impaction, but curette was used first without success .  Procedure type: curette   Sedation:  Patient sedated: no          Assessment/Plan     Problem List Items Addressed This Visit     None      Visit Diagnoses     Otalgia of both ears    -  Primary    Bilateral impacted cerumen        Relevant Orders    Ear Cerumen Removal Instrumentation          · Discussed autoinflation  · Return to clinic if not improving        Eliza Mayberry MD    Please note that portions of this note may have been completed with a voice recognition program. Efforts were made to edit dictation, but occasionally words are mistranscribed.

## 2018-05-18 ENCOUNTER — OFFICE VISIT (OUTPATIENT)
Dept: INTERNAL MEDICINE | Facility: CLINIC | Age: 28
End: 2018-05-18

## 2018-05-18 VITALS
TEMPERATURE: 98.4 F | SYSTOLIC BLOOD PRESSURE: 133 MMHG | WEIGHT: 286 LBS | BODY MASS INDEX: 38.74 KG/M2 | HEART RATE: 60 BPM | DIASTOLIC BLOOD PRESSURE: 83 MMHG | OXYGEN SATURATION: 98 % | HEIGHT: 72 IN

## 2018-05-18 DIAGNOSIS — Z83.3 FAMILY HISTORY OF DIABETES MELLITUS (DM): ICD-10-CM

## 2018-05-18 DIAGNOSIS — Z82.49 FAMILY HISTORY OF HEART DISEASE: ICD-10-CM

## 2018-05-18 DIAGNOSIS — R63.5 WEIGHT GAIN: Primary | ICD-10-CM

## 2018-05-18 DIAGNOSIS — K62.5 BRBPR (BRIGHT RED BLOOD PER RECTUM): ICD-10-CM

## 2018-05-18 PROCEDURE — 99214 OFFICE O/P EST MOD 30 MIN: CPT | Performed by: PHYSICIAN ASSISTANT

## 2018-05-18 NOTE — PROGRESS NOTES
Subjective     Chief Complaint: blood in stool    History of Present Illness     Britton Vogt Jr. is a 27 y.o. male presenting with complaints of few days BRB on tissue after BMs. Patient denies any constipation, straining, diarrhea, nausea, vomiting, dark stools. Doesn't believe any history of hemorrhoids. Does note occasional acid reflux symptoms. He also feels like he gets nauseous, looser stools, bloating with dairy products.    He also would like blood work as he has a very strong family history of diabetes and heart disease. States he knows he needs to lose weight. He's been working on diet and exercise.    The following portions of the patient's history were reviewed and updated as appropriate: current medications, allergies, PMH.    Review of Systems   Constitutional: Negative for appetite change, chills, fatigue, fever and unexpected weight change.   HENT: Negative for congestion, ear pain, hearing loss, nosebleeds, sinus pressure, sore throat, tinnitus and trouble swallowing.    Eyes: Negative for photophobia, discharge and visual disturbance.   Respiratory: Negative for cough, chest tightness, shortness of breath and wheezing.    Cardiovascular: Negative for chest pain, palpitations and leg swelling.   Gastrointestinal: Positive for blood in stool and nausea. Negative for abdominal distention, abdominal pain, constipation, diarrhea, rectal pain and vomiting.        Occasional acid reflux.   Endocrine: Negative for cold intolerance, heat intolerance, polydipsia, polyphagia and polyuria.   Genitourinary: Negative for difficulty urinating, dysuria, flank pain, frequency, hematuria and urgency.   Musculoskeletal: Negative.  Negative for arthralgias, back pain, joint swelling, myalgias, neck pain and neck stiffness.   Skin: Negative for color change, pallor, rash and wound.   Allergic/Immunologic: Negative for environmental allergies, food allergies and immunocompromised state.   Neurological: Negative for  "dizziness, weakness, numbness and headaches.   Hematological: Negative for adenopathy. Does not bruise/bleed easily.   Psychiatric/Behavioral: Negative for dysphoric mood, hallucinations, sleep disturbance and suicidal ideas. The patient is not nervous/anxious.      Objective     Vitals:    05/18/18 1646   BP: 133/83   Pulse: 60   Temp: 98.4 °F (36.9 °C)   SpO2: 98%   Weight: 130 kg (286 lb)   Height: 182.9 cm (72.01\")       Physical Exam   Constitutional: Appears well-developed and well-nourished.   Mouth/Throat: Oropharynx is clear and moist.   Eyes: EOM are normal. Pupils are equal, round, and reactive to light.   Neck: Normal range of motion. Neck supple.   Cardiovascular: Normal rate, regular rhythm and normal heart sounds.    Pulmonary/Chest: Effort normal and breath sounds normal.   Abdominal: Soft. Bowel sounds are normal. There is no tenderness.  Musculoskeletal: Normal range of motion.   Lymphadenopathy: No cervical adenopathy noted.   Neurological: Alert and oriented to person, place, and time.   Skin: Skin is warm and dry.   Psychiatric: Exhibits a normal mood and affect.     Assessment/Plan     Diagnoses and all orders for this visit:    Weight gain  -     Comprehensive Metabolic Panel  -     Hemoglobin A1c  -     CBC & Differential  -     TSH  -     Lipid Panel    Family history of diabetes mellitus (DM)  -     Comprehensive Metabolic Panel  -     Hemoglobin A1c    Family history of heart disease  -     CBC & Differential  -     Lipid Panel    BRBPR (bright red blood per rectum)    Patient given FOBT cards. Discussed GI referral if symptoms persist.  Also suggested avoidance of dairy to see if symptoms improve.    Neelima Mehta PA-C  05/18/2018         Please note that portions of this note were completed with a voice recognition program. Efforts were made to edit dictation, but occasionally words are mistranscribed.  "

## 2018-06-05 LAB
ALBUMIN SERPL-MCNC: 4.1 G/DL (ref 3.5–5)
ALBUMIN/GLOB SERPL: 1.3 G/DL (ref 1–2)
ALP SERPL-CCNC: 63 U/L (ref 38–126)
ALT SERPL-CCNC: 43 U/L (ref 13–69)
AST SERPL-CCNC: 37 U/L (ref 15–46)
BASOPHILS # BLD AUTO: 0.02 10*3/MM3 (ref 0–0.2)
BASOPHILS NFR BLD AUTO: 0.5 % (ref 0–2.5)
BILIRUB SERPL-MCNC: 0.4 MG/DL (ref 0.2–1.3)
BUN SERPL-MCNC: 11 MG/DL (ref 7–20)
BUN/CREAT SERPL: 12.2 (ref 6.3–21.9)
CALCIUM SERPL-MCNC: 9.8 MG/DL (ref 8.4–10.2)
CHLORIDE SERPL-SCNC: 102 MMOL/L (ref 98–107)
CHOLEST SERPL-MCNC: 183 MG/DL (ref 0–199)
CO2 SERPL-SCNC: 29 MMOL/L (ref 26–30)
CREAT SERPL-MCNC: 0.9 MG/DL (ref 0.6–1.3)
EOSINOPHIL # BLD AUTO: 0.05 10*3/MM3 (ref 0–0.7)
EOSINOPHIL NFR BLD AUTO: 1.2 % (ref 0–7)
ERYTHROCYTE [DISTWIDTH] IN BLOOD BY AUTOMATED COUNT: 12.4 % (ref 11.5–14.5)
GFR SERPLBLD CREATININE-BSD FMLA CKD-EPI: 103 ML/MIN/1.73
GFR SERPLBLD CREATININE-BSD FMLA CKD-EPI: 125 ML/MIN/1.73
GLOBULIN SER CALC-MCNC: 3.1 GM/DL
GLUCOSE SERPL-MCNC: 90 MG/DL (ref 74–98)
HBA1C MFR BLD: 5.9 %
HCT VFR BLD AUTO: 45.5 % (ref 42–52)
HDLC SERPL-MCNC: 44 MG/DL (ref 40–60)
HGB BLD-MCNC: 15.3 G/DL (ref 14–18)
IMM GRANULOCYTES # BLD: 0.01 10*3/MM3 (ref 0–0.06)
IMM GRANULOCYTES NFR BLD: 0.2 % (ref 0–0.6)
LDLC SERPL CALC-MCNC: 115 MG/DL (ref 0–99)
LYMPHOCYTES # BLD AUTO: 2.08 10*3/MM3 (ref 0.6–3.4)
LYMPHOCYTES NFR BLD AUTO: 48.3 % (ref 10–50)
MCH RBC QN AUTO: 29.7 PG (ref 27–31)
MCHC RBC AUTO-ENTMCNC: 33.6 G/DL (ref 30–37)
MCV RBC AUTO: 88.2 FL (ref 80–94)
MONOCYTES # BLD AUTO: 0.41 10*3/MM3 (ref 0–0.9)
MONOCYTES NFR BLD AUTO: 9.5 % (ref 0–12)
NEUTROPHILS # BLD AUTO: 1.74 10*3/MM3 (ref 2–6.9)
NEUTROPHILS NFR BLD AUTO: 40.3 % (ref 37–80)
NRBC BLD AUTO-RTO: 0 /100 WBC (ref 0–0)
PLATELET # BLD AUTO: 167 10*3/MM3 (ref 130–400)
POTASSIUM SERPL-SCNC: 4.4 MMOL/L (ref 3.5–5.1)
PROT SERPL-MCNC: 7.2 G/DL (ref 6.3–8.2)
RBC # BLD AUTO: 5.16 10*6/MM3 (ref 4.7–6.1)
SODIUM SERPL-SCNC: 142 MMOL/L (ref 137–145)
TRIGL SERPL-MCNC: 121 MG/DL
TSH SERPL DL<=0.005 MIU/L-ACNC: 1.25 MIU/ML (ref 0.47–4.68)
VLDLC SERPL CALC-MCNC: 24.2 MG/DL
WBC # BLD AUTO: 4.31 10*3/MM3 (ref 4.8–10.8)

## 2018-06-08 ENCOUNTER — OFFICE VISIT (OUTPATIENT)
Dept: INTERNAL MEDICINE | Facility: CLINIC | Age: 28
End: 2018-06-08

## 2018-06-08 VITALS
HEART RATE: 88 BPM | DIASTOLIC BLOOD PRESSURE: 72 MMHG | TEMPERATURE: 97.7 F | WEIGHT: 284 LBS | OXYGEN SATURATION: 97 % | SYSTOLIC BLOOD PRESSURE: 120 MMHG | HEIGHT: 72 IN | BODY MASS INDEX: 38.47 KG/M2 | RESPIRATION RATE: 14 BRPM

## 2018-06-08 DIAGNOSIS — K62.5 BRBPR (BRIGHT RED BLOOD PER RECTUM): Primary | ICD-10-CM

## 2018-06-08 DIAGNOSIS — E66.9 OBESITY (BMI 30-39.9): ICD-10-CM

## 2018-06-08 DIAGNOSIS — Z30.09 FAMILY PLANNING EDUCATION, GUIDANCE, AND COUNSELING: ICD-10-CM

## 2018-06-08 PROCEDURE — 99213 OFFICE O/P EST LOW 20 MIN: CPT | Performed by: FAMILY MEDICINE

## 2018-06-08 NOTE — PROGRESS NOTES
"Subjective    Britton Vogt Jr. is a 27 y.o. male here for:  Chief Complaint   Patient presents with   • Rectal Bleeding     History of Present Illness     Seen recently due to bright red blood per rectum. That has resolved, no further episodes. Bowel movements passing without problem. No melena.    Has started going back to gym. Doing more cardio rather than weights. Feels he's making gains. Trying to lose weight due to the mild elevation in A1C (see below) and his family history of diabetes mellitus.    Has been trying for pregnancy with girlfriend x 7 months. Asks for advice, he very much is wanting a child.    The following portions of the patient's history were reviewed and updated as appropriate: allergies, current medications, past family history, past medical history, past social history, past surgical history and problem list.    Review of Systems   Constitutional: Positive for activity change. Negative for unexpected weight loss.   Neurological: Negative for weakness.   Psychiatric/Behavioral: Positive for stress.       Vitals:    06/08/18 1559   BP: 120/72   Pulse: 88   Resp: 14   Temp: 97.7 °F (36.5 °C)   SpO2: 97%   Weight: 129 kg (284 lb)   Height: 182.9 cm (72\")         Objective   Physical Exam   Constitutional: He is oriented to person, place, and time. Vital signs are normal. He appears well-developed and well-nourished. He is active.  Non-toxic appearance. He does not appear ill. No distress. He is obese (muscular build).  HENT:   Head: Normocephalic and atraumatic.   Eyes: EOM are normal.   Neck: Neck supple.   Pulmonary/Chest: Effort normal.   Neurological: He is alert and oriented to person, place, and time. No cranial nerve deficit.   Skin: Skin is warm. He is not diaphoretic.   Professional tattoos   Psychiatric: He has a normal mood and affect. His behavior is normal.   Nursing note and vitals reviewed.        Assessment/Plan     Problem List Items Addressed This Visit     None      Visit " Diagnoses     BRBPR (bright red blood per rectum)    -  Primary    Resolved per patient. If returns must consider colonoscopy.    Obesity (BMI 30-39.9)        Family planning education, guidance, and counseling              · Patient's Body mass index is 38.52 kg/m². BMI is above normal parameters. Recommendations include: exercise counseling and nutrition counseling.  · Educated patient in regards to fertility work up, use of lubricants (Pre-Seed), importance of girlfriend taking prenatal vitamin to build folic acid, ovulation tracking, need for Tdap (in both parents) etc.   · I spent 30 minutes face-to-face with the patient with >50% of time devoted to counseling and coordination of care.   ·     No Follow-up on file.    Eliza Mayberry MD    Please note that portions of this note may have been completed with a voice recognition program. Efforts were made to edit dictation, but occasionally words are mistranscribed.

## 2018-08-01 ENCOUNTER — OFFICE VISIT (OUTPATIENT)
Dept: INTERNAL MEDICINE | Facility: CLINIC | Age: 28
End: 2018-08-01

## 2018-08-01 VITALS
SYSTOLIC BLOOD PRESSURE: 120 MMHG | HEART RATE: 59 BPM | DIASTOLIC BLOOD PRESSURE: 70 MMHG | TEMPERATURE: 98.6 F | OXYGEN SATURATION: 98 % | BODY MASS INDEX: 35.28 KG/M2 | WEIGHT: 260.5 LBS | HEIGHT: 72 IN

## 2018-08-01 DIAGNOSIS — R74.8 ELEVATED CREATINE KINASE LEVEL: ICD-10-CM

## 2018-08-01 DIAGNOSIS — G72.9 MYOPATHY: ICD-10-CM

## 2018-08-01 DIAGNOSIS — R39.9 URINARY SYMPTOM OR SIGN: Primary | ICD-10-CM

## 2018-08-01 DIAGNOSIS — H92.01 RIGHT EAR PAIN: ICD-10-CM

## 2018-08-01 LAB
BILIRUB BLD-MCNC: NEGATIVE MG/DL
CLARITY, POC: CLEAR
COLOR UR: YELLOW
GLUCOSE UR STRIP-MCNC: NEGATIVE MG/DL
KETONES UR QL: NEGATIVE
LEUKOCYTE EST, POC: NEGATIVE
NITRITE UR-MCNC: NEGATIVE MG/ML
PH UR: 5 [PH] (ref 5–8)
PROT UR STRIP-MCNC: NEGATIVE MG/DL
RBC # UR STRIP: NEGATIVE /UL
SP GR UR: 1.02 (ref 1–1.03)
UROBILINOGEN UR QL: NORMAL

## 2018-08-01 PROCEDURE — 81003 URINALYSIS AUTO W/O SCOPE: CPT | Performed by: FAMILY MEDICINE

## 2018-08-01 PROCEDURE — 99214 OFFICE O/P EST MOD 30 MIN: CPT | Performed by: FAMILY MEDICINE

## 2018-08-01 NOTE — PROGRESS NOTES
"Subjective    Britton Vogt Jr. is a 27 y.o. male here for:  Chief Complaint   Patient presents with   • Groin Pain     Would like to discuss some personal issues.   • Earache     Complaints of still having issues with an earache. Patient states that the pain is going down into his neck.   • GI Problem     Complaints of Upper mid and mid quadrant abdominal pain. Patient states that he has had this issue before.     History of Present Illness     Not feeling well. Still having ear ache despite continued attempts at autoinflation. Also has had some pain on right side of neck toward ear. Having bad headaches. Stomach pain has come back. Abdominal pain middle, sometimes on sides. Feels deep in abdomen. Has nausea at times. Tries to drink plenty of water. Continues to feel muscle weakness and overall lethargy, feels poorly overall. I've referred him previously to rheumatology for his complaints but he never heard back from [facility] after doing labs last February (2017). 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 Both rheumatology and neurology felt muscle biopsy was not needed. He has not been aggressively exercising recently, though in the past we've discussed his difficulty with \"gains\" despite weight lifting in college while playing [employer] football.    Continues to try for pregnancy with girlfriend, not successful yet.     The following portions of the patient's history were reviewed and updated as appropriate: allergies, current medications, past family history, past medical history, past social history, past surgical history and problem list.    Review of Systems   Constitutional: Positive for fatigue.   HENT: Positive for ear pain.    Gastrointestinal: Positive for abdominal pain.   Musculoskeletal: Positive for arthralgias, back pain and myalgias.   Neurological: Positive for weakness.   Psychiatric/Behavioral: Positive for " "stress. Negative for depressed mood.       Vitals:    08/01/18 1427   BP: 120/70   Pulse: 59   Temp: 98.6 °F (37 °C)   SpO2: 98%   Weight: 118 kg (260 lb 8 oz)   Height: 182.9 cm (72.01\")         Objective   Physical Exam   Constitutional: He is oriented to person, place, and time. Vital signs are normal. He appears well-developed and well-nourished. He is active.  Non-toxic appearance. He does not have a sickly appearance. No distress.   Muscular build   HENT:   Head: Normocephalic and atraumatic.   Right Ear: Hearing, tympanic membrane, external ear and ear canal normal.   Left Ear: Hearing, tympanic membrane, external ear and ear canal normal.   Mouth/Throat: Mucous membranes are not dry.   Eyes: EOM are normal. No scleral icterus.   Neck: Phonation normal. Neck supple.   Cardiovascular: Normal rate, regular rhythm and normal heart sounds.  Exam reveals no gallop and no friction rub.    No murmur heard.  Pulmonary/Chest: Effort normal and breath sounds normal.   Neurological: He is alert and oriented to person, place, and time. He displays no tremor. No cranial nerve deficit. Gait normal.   Skin: Skin is warm. No rash noted. He is not diaphoretic. No cyanosis.   Tattoos, professional   Psychiatric: He has a normal mood and affect. His speech is normal and behavior is normal. Judgment and thought content normal. Cognition and memory are normal.   Nursing note and vitals reviewed.      Reviewed labs from  dated 2/15/17. Notable for ck 718, aldolase elevated slightly 8.9 (normal 8.1 at the [facility] lab)    Assessment/Plan     Problem List Items Addressed This Visit        Other    Elevated creatine kinase level    Relevant Orders    CK (Completed)    LORNA Comprehensive Plus Profile (Completed)    LORNA (Completed)    Myoglobin, Serum (Completed)    Aldolase (Completed)    TSH (Completed)    T4, Free (Completed)    Gamma GT (Completed)    CBC & Differential (Completed)    Comprehensive Metabolic Panel (Completed)    IBD Sgi " Diagnostic (Completed)    C-reactive Protein (Completed)    Sedimentation Rate (Completed)    ANCA Panel (Completed)      Other Visit Diagnoses     Urinary symptom or sign    -  Primary    Relevant Orders    POCT urinalysis dipstick, automated (Completed)    Myopathy        Relevant Orders    CK (Completed)    LORNA Comprehensive Plus Profile (Completed)    LORNA (Completed)    Myoglobin, Serum (Completed)    Aldolase (Completed)    TSH (Completed)    T4, Free (Completed)    Gamma GT (Completed)    CBC & Differential (Completed)    Comprehensive Metabolic Panel (Completed)    IBD Sgi Diagnostic (Completed)    C-reactive Protein (Completed)    Sedimentation Rate (Completed)    ANCA Panel (Completed)    Right ear pain        Relevant Orders    Ambulatory Referral to ENT (Otolaryngology)          · No blood on UA, checking to see if could be passing myoglobin in urine, negative test, though.   · Differential for his persistent CK elevation includes a polymyositis (no rashes to support dermatomyositis), muscular dystrophies. previous thyroid labs normal but rechecked to ensure this is not underlying cause.   · I reviewed his [facility] labs. Seen by [facility] rheumatology 2/2017. Had labs after clinic visit but patient notes he was not contacted afterwards for further follow up or evaluation.        Eliza Mayberry MD

## 2018-08-03 LAB — SPECIMEN STATUS: NORMAL

## 2018-08-07 LAB
ALBUMIN SERPL-MCNC: 4.7 G/DL (ref 3.5–5.5)
ALBUMIN/GLOB SERPL: 1.8 {RATIO} (ref 1.2–2.2)
ALDOLASE SERPL-CCNC: 18.2 U/L (ref 3.3–10.3)
ALP SERPL-CCNC: 57 IU/L (ref 39–117)
ALT SERPL-CCNC: 39 IU/L (ref 0–44)
ANA SER QL: NEGATIVE
AST SERPL-CCNC: 45 IU/L (ref 0–40)
BASOPHILS # BLD AUTO: 0 X10E3/UL (ref 0–0.2)
BASOPHILS NFR BLD AUTO: 1 %
BILIRUB SERPL-MCNC: 0.5 MG/DL (ref 0–1.2)
BUN SERPL-MCNC: 12 MG/DL (ref 6–20)
BUN/CREAT SERPL: 12 (ref 9–20)
C-ANCA TITR SER IF: NORMAL TITER
CALCIUM SERPL-MCNC: 9.8 MG/DL (ref 8.7–10.2)
CENTROMERE B AB SER-ACNC: <0.2 AI (ref 0–0.9)
CHLORIDE SERPL-SCNC: 102 MMOL/L (ref 96–106)
CHROMATIN AB SERPL-ACNC: <0.2 AI (ref 0–0.9)
CK SERPL-CCNC: 1928 U/L (ref 24–204)
CO2 SERPL-SCNC: 26 MMOL/L (ref 20–29)
CREAT SERPL-MCNC: 0.97 MG/DL (ref 0.76–1.27)
CRP SERPL-MCNC: 1 MG/L (ref 0–4.9)
DSDNA AB SER-ACNC: <1 IU/ML (ref 0–9)
ENA JO1 AB SER-ACNC: <0.2 AI (ref 0–0.9)
ENA RNP AB SER-ACNC: <0.2 AI (ref 0–0.9)
ENA SCL70 AB SER-ACNC: <0.2 AI (ref 0–0.9)
ENA SM AB SER-ACNC: <0.2 AI (ref 0–0.9)
ENA SM+RNP AB SER-ACNC: <0.2 AI (ref 0–0.9)
ENA SS-A AB SER-ACNC: <0.2 AI (ref 0–0.9)
ENA SS-B AB SER-ACNC: <0.2 AI (ref 0–0.9)
EOSINOPHIL # BLD AUTO: 0.1 X10E3/UL (ref 0–0.4)
EOSINOPHIL NFR BLD AUTO: 1 %
ERYTHROCYTE [DISTWIDTH] IN BLOOD BY AUTOMATED COUNT: 14 % (ref 12.3–15.4)
ERYTHROCYTE [SEDIMENTATION RATE] IN BLOOD BY WESTERGREN METHOD: 2 MM/HR (ref 0–15)
GGT SERPL-CCNC: 35 IU/L (ref 0–65)
GLOBULIN SER CALC-MCNC: 2.6 G/DL (ref 1.5–4.5)
GLUCOSE SERPL-MCNC: 82 MG/DL (ref 65–99)
HCT VFR BLD AUTO: 43.4 % (ref 37.5–51)
HGB BLD-MCNC: 14.8 G/DL (ref 13–17.7)
IMM GRANULOCYTES # BLD: 0 X10E3/UL (ref 0–0.1)
IMM GRANULOCYTES NFR BLD: 0 %
LYMPHOCYTES # BLD AUTO: 2.9 X10E3/UL (ref 0.7–3.1)
LYMPHOCYTES NFR BLD AUTO: 51 %
Lab: NORMAL
MCH RBC QN AUTO: 30.1 PG (ref 26.6–33)
MCHC RBC AUTO-ENTMCNC: 34.1 G/DL (ref 31.5–35.7)
MCV RBC AUTO: 88 FL (ref 79–97)
MONOCYTES # BLD AUTO: 0.3 X10E3/UL (ref 0.1–0.9)
MONOCYTES NFR BLD AUTO: 6 %
MYELOPEROXIDASE AB SER IA-ACNC: <9 U/ML (ref 0–9)
MYOGLOBIN SERPL-MCNC: 174 NG/ML (ref 28–72)
NEUTROPHILS # BLD AUTO: 2.3 X10E3/UL (ref 1.4–7)
NEUTROPHILS NFR BLD AUTO: 41 %
P-ANCA ATYPICAL TITR SER IF: NORMAL TITER
P-ANCA TITR SER IF: NORMAL TITER
PLATELET # BLD AUTO: 187 X10E3/UL (ref 150–379)
POTASSIUM SERPL-SCNC: 4.2 MMOL/L (ref 3.5–5.2)
PROT SERPL-MCNC: 7.3 G/DL (ref 6–8.5)
PROTEINASE3 AB SER IA-ACNC: <3.5 U/ML (ref 0–3.5)
RBC # BLD AUTO: 4.92 X10E6/UL (ref 4.14–5.8)
RIBOSOMAL P AB SER-ACNC: <0.2 AI (ref 0–0.9)
SODIUM SERPL-SCNC: 141 MMOL/L (ref 134–144)
SPECIMEN STATUS: NORMAL
SPECIMEN STATUS: NORMAL
T4 FREE SERPL-MCNC: 0.95 NG/DL (ref 0.82–1.77)
TSH SERPL DL<=0.005 MIU/L-ACNC: 1.44 UIU/ML (ref 0.45–4.5)
WBC # BLD AUTO: 5.6 X10E3/UL (ref 3.4–10.8)

## 2018-12-06 PROCEDURE — 86592 SYPHILIS TEST NON-TREP QUAL: CPT | Performed by: FAMILY MEDICINE

## 2018-12-06 PROCEDURE — 82550 ASSAY OF CK (CPK): CPT | Performed by: FAMILY MEDICINE

## 2018-12-06 PROCEDURE — 83874 ASSAY OF MYOGLOBIN: CPT | Performed by: FAMILY MEDICINE

## 2018-12-06 PROCEDURE — 86618 LYME DISEASE ANTIBODY: CPT | Performed by: FAMILY MEDICINE

## 2018-12-06 PROCEDURE — 84402 ASSAY OF FREE TESTOSTERONE: CPT | Performed by: FAMILY MEDICINE

## 2018-12-06 PROCEDURE — 82553 CREATINE MB FRACTION: CPT | Performed by: FAMILY MEDICINE

## 2018-12-06 PROCEDURE — 93005 ELECTROCARDIOGRAM TRACING: CPT | Performed by: EMERGENCY MEDICINE

## 2018-12-06 PROCEDURE — 99284 EMERGENCY DEPT VISIT MOD MDM: CPT

## 2018-12-06 PROCEDURE — 93005 ELECTROCARDIOGRAM TRACING: CPT

## 2018-12-06 PROCEDURE — 96374 THER/PROPH/DIAG INJ IV PUSH: CPT

## 2018-12-06 PROCEDURE — 84403 ASSAY OF TOTAL TESTOSTERONE: CPT | Performed by: FAMILY MEDICINE

## 2018-12-07 ENCOUNTER — APPOINTMENT (OUTPATIENT)
Dept: GENERAL RADIOLOGY | Facility: HOSPITAL | Age: 28
End: 2018-12-07

## 2018-12-07 ENCOUNTER — HOSPITAL ENCOUNTER (EMERGENCY)
Facility: HOSPITAL | Age: 28
Discharge: HOME | End: 2018-12-07
Attending: EMERGENCY MEDICINE | Admitting: EMERGENCY MEDICINE

## 2018-12-07 VITALS
DIASTOLIC BLOOD PRESSURE: 81 MMHG | OXYGEN SATURATION: 98 % | BODY MASS INDEX: 38.19 KG/M2 | HEIGHT: 72 IN | HEART RATE: 68 BPM | WEIGHT: 282 LBS | SYSTOLIC BLOOD PRESSURE: 126 MMHG | TEMPERATURE: 98.2 F | RESPIRATION RATE: 16 BRPM

## 2018-12-07 DIAGNOSIS — R51.9 NONINTRACTABLE HEADACHE, UNSPECIFIED CHRONICITY PATTERN, UNSPECIFIED HEADACHE TYPE: ICD-10-CM

## 2018-12-07 DIAGNOSIS — M79.10 MYALGIA: ICD-10-CM

## 2018-12-07 DIAGNOSIS — M62.82 NON-TRAUMATIC RHABDOMYOLYSIS: ICD-10-CM

## 2018-12-07 DIAGNOSIS — R19.7 DIARRHEA, UNSPECIFIED TYPE: ICD-10-CM

## 2018-12-07 DIAGNOSIS — R10.9 ABDOMINAL PAIN, UNSPECIFIED ABDOMINAL LOCATION: Primary | ICD-10-CM

## 2018-12-07 DIAGNOSIS — R74.8 ELEVATED CK: ICD-10-CM

## 2018-12-07 DIAGNOSIS — R07.9 CHEST PAIN, UNSPECIFIED TYPE: ICD-10-CM

## 2018-12-07 LAB
ALBUMIN SERPL-MCNC: 4.21 G/DL (ref 3.2–4.8)
ALBUMIN/GLOB SERPL: 1.8 G/DL (ref 1.5–2.5)
ALP SERPL-CCNC: 62 U/L (ref 25–100)
ALT SERPL W P-5'-P-CCNC: 42 U/L (ref 7–40)
ANION GAP SERPL CALCULATED.3IONS-SCNC: 5 MMOL/L (ref 3–11)
AST SERPL-CCNC: 33 U/L (ref 0–33)
BASOPHILS # BLD AUTO: 0.03 10*3/MM3 (ref 0–0.2)
BASOPHILS NFR BLD AUTO: 0.4 % (ref 0–1)
BILIRUB SERPL-MCNC: 0.2 MG/DL (ref 0.3–1.2)
BILIRUB UR QL STRIP: NEGATIVE
BUN BLD-MCNC: 16 MG/DL (ref 9–23)
BUN/CREAT SERPL: 16.5 (ref 7–25)
CALCIUM SPEC-SCNC: 9.2 MG/DL (ref 8.7–10.4)
CHLORIDE SERPL-SCNC: 103 MMOL/L (ref 99–109)
CK SERPL-CCNC: 786 U/L (ref 26–174)
CLARITY UR: CLEAR
CO2 SERPL-SCNC: 29 MMOL/L (ref 20–31)
COLOR UR: YELLOW
CREAT BLD-MCNC: 0.97 MG/DL (ref 0.6–1.3)
DEPRECATED RDW RBC AUTO: 42 FL (ref 37–54)
EOSINOPHIL # BLD AUTO: 0.1 10*3/MM3 (ref 0–0.3)
EOSINOPHIL NFR BLD AUTO: 1.4 % (ref 0–3)
ERYTHROCYTE [DISTWIDTH] IN BLOOD BY AUTOMATED COUNT: 12.9 % (ref 11.3–14.5)
GFR SERPL CREATININE-BSD FRML MDRD: 113 ML/MIN/1.73
GLOBULIN UR ELPH-MCNC: 2.4 GM/DL
GLUCOSE BLD-MCNC: 123 MG/DL (ref 70–100)
GLUCOSE UR STRIP-MCNC: NEGATIVE MG/DL
HCT VFR BLD AUTO: 45.6 % (ref 38.9–50.9)
HGB BLD-MCNC: 15.1 G/DL (ref 13.1–17.5)
HGB UR QL STRIP.AUTO: NEGATIVE
IMM GRANULOCYTES # BLD: 0.02 10*3/MM3 (ref 0–0.03)
IMM GRANULOCYTES NFR BLD: 0.3 % (ref 0–0.6)
KETONES UR QL STRIP: NEGATIVE
LEUKOCYTE ESTERASE UR QL STRIP.AUTO: NEGATIVE
LIPASE SERPL-CCNC: 26 U/L (ref 6–51)
LYMPHOCYTES # BLD AUTO: 3.34 10*3/MM3 (ref 0.6–4.8)
LYMPHOCYTES NFR BLD AUTO: 47.2 % (ref 24–44)
MCH RBC QN AUTO: 29.7 PG (ref 27–31)
MCHC RBC AUTO-ENTMCNC: 33.1 G/DL (ref 32–36)
MCV RBC AUTO: 89.6 FL (ref 80–99)
MONOCYTES # BLD AUTO: 0.42 10*3/MM3 (ref 0–1)
MONOCYTES NFR BLD AUTO: 5.9 % (ref 0–12)
NEUTROPHILS # BLD AUTO: 3.19 10*3/MM3 (ref 1.5–8.3)
NEUTROPHILS NFR BLD AUTO: 45.1 % (ref 41–71)
NITRITE UR QL STRIP: NEGATIVE
PH UR STRIP.AUTO: <=5 [PH] (ref 5–8)
PLATELET # BLD AUTO: 203 10*3/MM3 (ref 150–450)
PMV BLD AUTO: 11.2 FL (ref 6–12)
POTASSIUM BLD-SCNC: 4.2 MMOL/L (ref 3.5–5.5)
PROT SERPL-MCNC: 6.6 G/DL (ref 5.7–8.2)
PROT UR QL STRIP: NEGATIVE
RBC # BLD AUTO: 5.09 10*6/MM3 (ref 4.2–5.76)
SODIUM BLD-SCNC: 137 MMOL/L (ref 132–146)
SP GR UR STRIP: 1.02 (ref 1–1.03)
TROPONIN I SERPL-MCNC: 0.01 NG/ML (ref 0–0.07)
UROBILINOGEN UR QL STRIP: NORMAL
WBC NRBC COR # BLD: 7.08 10*3/MM3 (ref 3.5–10.8)

## 2018-12-07 PROCEDURE — 71045 X-RAY EXAM CHEST 1 VIEW: CPT

## 2018-12-07 PROCEDURE — 25010000002 KETOROLAC TROMETHAMINE PER 15 MG: Performed by: EMERGENCY MEDICINE

## 2018-12-07 PROCEDURE — 80053 COMPREHEN METABOLIC PANEL: CPT | Performed by: EMERGENCY MEDICINE

## 2018-12-07 PROCEDURE — 83690 ASSAY OF LIPASE: CPT | Performed by: EMERGENCY MEDICINE

## 2018-12-07 PROCEDURE — 82550 ASSAY OF CK (CPK): CPT | Performed by: EMERGENCY MEDICINE

## 2018-12-07 PROCEDURE — 81003 URINALYSIS AUTO W/O SCOPE: CPT | Performed by: EMERGENCY MEDICINE

## 2018-12-07 PROCEDURE — 85025 COMPLETE CBC W/AUTO DIFF WBC: CPT | Performed by: EMERGENCY MEDICINE

## 2018-12-07 PROCEDURE — 84484 ASSAY OF TROPONIN QUANT: CPT

## 2018-12-07 PROCEDURE — 93005 ELECTROCARDIOGRAM TRACING: CPT | Performed by: EMERGENCY MEDICINE

## 2018-12-07 RX ORDER — SODIUM CHLORIDE 0.9 % (FLUSH) 0.9 %
10 SYRINGE (ML) INJECTION AS NEEDED
Status: DISCONTINUED | OUTPATIENT
Start: 2018-12-07 | End: 2018-12-07 | Stop reason: HOSPADM

## 2018-12-07 RX ORDER — KETOROLAC TROMETHAMINE 15 MG/ML
15 INJECTION, SOLUTION INTRAMUSCULAR; INTRAVENOUS ONCE
Status: COMPLETED | OUTPATIENT
Start: 2018-12-07 | End: 2018-12-07

## 2018-12-07 RX ADMIN — KETOROLAC TROMETHAMINE 15 MG: 15 INJECTION INTRAMUSCULAR; INTRAVENOUS at 03:31

## 2018-12-07 RX ADMIN — SODIUM CHLORIDE 500 ML: 9 INJECTION, SOLUTION INTRAVENOUS at 03:32

## 2018-12-07 RX ADMIN — SODIUM CHLORIDE 1000 ML: 9 INJECTION, SOLUTION INTRAVENOUS at 04:07

## 2018-12-07 NOTE — ED PROVIDER NOTES
Subjective   28-year-old male presents with multiple complaints.  The patient states that his symptoms started approximately 4 years ago.  At that time, he states that he began developing some chest pain that has bothered him intermittently ever since.  He states that he followed up with his primary care physician following his initial episode of chest pain and has had a myriad of symptoms since that time including visual disturbances, myalgias, abdominal pain, diarrhea, neck pain, arthralgias, headaches, and an overall sensation of not feeling well.  His symptoms have been ongoing now for several years.  He states that he recently changed jobs and that his insurance just kicked in as of this week.  His primary care physician had previously referred him to a rheumatologist, but he has not yet been able to follow-up.  He states that he has a history of rhabdomyolysis in the past but denies any heavy exercise or recent heavy weightlifting.  Currently, his main complaints are myalgias and arthralgias.        History provided by:  Patient  Abdominal Pain   Pain location:  Generalized  Pain radiates to:  Does not radiate  Pain severity:  Moderate  Onset quality:  Gradual  Duration: Last several days.  Timing:  Constant  Progression:  Worsening  Chronicity:  Chronic  Relieved by:  None tried  Worsened by:  Nothing  Ineffective treatments:  None tried  Associated symptoms: chest pain and diarrhea    Associated symptoms: no fever        Review of Systems   Constitutional: Negative for fever.   Eyes: Positive for visual disturbance.   Respiratory: Negative.    Cardiovascular: Positive for chest pain.   Gastrointestinal: Positive for abdominal pain and diarrhea.   Genitourinary: Negative.    Musculoskeletal: Positive for arthralgias, myalgias and neck pain.   Skin: Negative.    Neurological: Positive for headaches.   Psychiatric/Behavioral: Negative.    All other systems reviewed and are negative.      Past Medical History:    Diagnosis Date   • Anxiety    • Back pain    • Depression    • Elevated blood pressure reading    • Elevated CK    • Epididymitis    • Hyporeflexia    • Rhabdomyolysis        No Known Allergies    Past Surgical History:   Procedure Laterality Date   • WISDOM TOOTH EXTRACTION         Family History   Problem Relation Age of Onset   • Diabetes Mother    • Hypertension Mother    • Stroke Mother    • Depression Maternal Uncle    • Stroke Maternal Uncle    • Depression Maternal Grandmother    • Stroke Other    • Diabetes Other    • Cancer Other    • Diabetes Other    • Cancer Other    • Diabetes Other    • Cancer Other    • Heart failure Sister 41       Social History     Socioeconomic History   • Marital status: Single     Spouse name: Not on file   • Number of children: Not on file   • Years of education: Not on file   • Highest education level: Not on file   Tobacco Use   • Smoking status: Never Smoker   • Smokeless tobacco: Never Used   Substance and Sexual Activity   • Alcohol use: No     Frequency: Never   • Drug use: No   • Sexual activity: Defer         Objective   Physical Exam   Constitutional: He is oriented to person, place, and time. He appears well-developed and well-nourished. No distress.   Well-appearing male in no acute distress   HENT:   Head: Normocephalic and atraumatic.   Mouth/Throat: Oropharynx is clear and moist.   Neck: Normal range of motion. No JVD present.   Cardiovascular: Normal rate, regular rhythm and normal heart sounds. Exam reveals no gallop and no friction rub.   No murmur heard.  Pulmonary/Chest: Effort normal and breath sounds normal. No respiratory distress. He has no wheezes. He has no rales.   Abdominal: Soft. Bowel sounds are normal. He exhibits no distension and no mass. There is no tenderness. There is no guarding.   Musculoskeletal: Normal range of motion.   Neurological: He is alert and oriented to person, place, and time.   Skin: Skin is warm and dry. No rash noted. He is  "not diaphoretic. No erythema. No pallor.   Psychiatric: He has a normal mood and affect. Judgment and thought content normal.   Nursing note and vitals reviewed.      Procedures         ED Course  ED Course as of Dec 07 0559   Fri Dec 07, 2018   0240 28-year-old male presents with a myriad of symptoms and multiple complaints that have been bothering him for the past several years.  Over that span, he has been experiencing intermittent chest pain, myalgias, arthralgias, headaches, diarrhea, and an overall sensation of not feeling well, prompting his visit to the ED tonight as his insurance just \"kicked in.\"  On arrival to the ED, patient well-appearing.  Benign exam.  Nonsurgical abdomen.  No neurological deficits noted.  Vital signs reassuring.  EKG revealed sinus bradycardia with a heart rate of 59 and no ST segments suggestive of or concerning for ischemia.  Chest x-ray negative.  We will obtain labs and we'll reassess following initial interventions.  [DD]   0348 Labs remarkable only for CK of 786.  The patient states that his CK has been elevated before multiple times in the past (as high as the thousands).  IV fluids administered.  Normal kidney function.  Patient reassured and counseled regarding symptomatic treatment as well as importance of copious oral fluids.  Given the chronic nature of his symptoms, he was referred to rheumatology and will follow-up within the next week.  Agreeable with plan and given appropriate return precautions.  [DD]      ED Course User Index  [DD] Mik Mulilns MD     Recent Results (from the past 24 hour(s))   CK Total & CKMB    Collection Time: 12/06/18 11:55 AM   Result Value Ref Range    CKMB 8.88 (H) 0.00 - 5.00 ng/mL    Creatine Kinase 923 (H) 26 - 174 U/L   Myoglobin, Serum    Collection Time: 12/06/18 11:55 AM   Result Value Ref Range    Myoglobin 106.0 3.0 - 110.0 ng/mL   CK-MB Index    Collection Time: 12/06/18 11:55 AM   Result Value Ref Range    CK-MB Index 1.0 " "0.0 - 3.0 %     Note: In addition to lab results from this visit, the labs listed above may include labs taken at another facility or during a different encounter within the last 24 hours. Please correlate lab times with ED admission and discharge times for further clarification of the services performed during this visit.    XR Chest 1 View    (Results Pending)     Vitals:    12/06/18 2300   BP: 145/72   BP Location: Left arm   Patient Position: Sitting   Pulse: 83   Resp: 16   Temp: 98.2 °F (36.8 °C)   TempSrc: Oral   SpO2: 98%   Weight: 128 kg (282 lb)   Height: 182.9 cm (72\")     Medications   sodium chloride 0.9 % flush 10 mL (not administered)     ECG/EMG Results (last 24 hours)     Procedure Component Value Units Date/Time    ECG 12 Lead [[id number]] Collected:  12/06/18 2332     Updated:  12/06/18 2333                    Recent Results (from the past 24 hour(s))   CK Total & CKMB    Collection Time: 12/06/18 11:55 AM   Result Value Ref Range    CKMB 8.88 (H) 0.00 - 5.00 ng/mL    Creatine Kinase 923 (H) 26 - 174 U/L   Myoglobin, Serum    Collection Time: 12/06/18 11:55 AM   Result Value Ref Range    Myoglobin 106.0 3.0 - 110.0 ng/mL   CK-MB Index    Collection Time: 12/06/18 11:55 AM   Result Value Ref Range    CK-MB Index 1.0 0.0 - 3.0 %   Urinalysis With Microscopic If Indicated (No Culture) - Urine, Clean Catch    Collection Time: 12/07/18  1:38 AM   Result Value Ref Range    Color, UA Yellow Yellow, Straw    Appearance, UA Clear Clear    pH, UA <=5.0 5.0 - 8.0    Specific Gravity, UA 1.024 1.001 - 1.030    Glucose, UA Negative Negative    Ketones, UA Negative Negative    Bilirubin, UA Negative Negative    Blood, UA Negative Negative    Protein, UA Negative Negative    Leuk Esterase, UA Negative Negative    Nitrite, UA Negative Negative    Urobilinogen, UA 0.2 E.U./dL 0.2 - 1.0 E.U./dL   Comprehensive Metabolic Panel    Collection Time: 12/07/18  2:07 AM   Result Value Ref Range    Glucose 123 (H) 70 - 100 " mg/dL    BUN 16 9 - 23 mg/dL    Creatinine 0.97 0.60 - 1.30 mg/dL    Sodium 137 132 - 146 mmol/L    Potassium 4.2 3.5 - 5.5 mmol/L    Chloride 103 99 - 109 mmol/L    CO2 29.0 20.0 - 31.0 mmol/L    Calcium 9.2 8.7 - 10.4 mg/dL    Total Protein 6.6 5.7 - 8.2 g/dL    Albumin 4.21 3.20 - 4.80 g/dL    ALT (SGPT) 42 (H) 7 - 40 U/L    AST (SGOT) 33 0 - 33 U/L    Alkaline Phosphatase 62 25 - 100 U/L    Total Bilirubin 0.2 (L) 0.3 - 1.2 mg/dL    eGFR  African Amer 113 >60 mL/min/1.73    Globulin 2.4 gm/dL    A/G Ratio 1.8 1.5 - 2.5 g/dL    BUN/Creatinine Ratio 16.5 7.0 - 25.0    Anion Gap 5.0 3.0 - 11.0 mmol/L   Lipase    Collection Time: 12/07/18  2:07 AM   Result Value Ref Range    Lipase 26 6 - 51 U/L   CK    Collection Time: 12/07/18  2:07 AM   Result Value Ref Range    Creatine Kinase 786 (H) 26 - 174 U/L   CBC Auto Differential    Collection Time: 12/07/18  2:07 AM   Result Value Ref Range    WBC 7.08 3.50 - 10.80 10*3/mm3    RBC 5.09 4.20 - 5.76 10*6/mm3    Hemoglobin 15.1 13.1 - 17.5 g/dL    Hematocrit 45.6 38.9 - 50.9 %    MCV 89.6 80.0 - 99.0 fL    MCH 29.7 27.0 - 31.0 pg    MCHC 33.1 32.0 - 36.0 g/dL    RDW 12.9 11.3 - 14.5 %    RDW-SD 42.0 37.0 - 54.0 fl    MPV 11.2 6.0 - 12.0 fL    Platelets 203 150 - 450 10*3/mm3    Neutrophil % 45.1 41.0 - 71.0 %    Lymphocyte % 47.2 (H) 24.0 - 44.0 %    Monocyte % 5.9 0.0 - 12.0 %    Eosinophil % 1.4 0.0 - 3.0 %    Basophil % 0.4 0.0 - 1.0 %    Immature Grans % 0.3 0.0 - 0.6 %    Neutrophils, Absolute 3.19 1.50 - 8.30 10*3/mm3    Lymphocytes, Absolute 3.34 0.60 - 4.80 10*3/mm3    Monocytes, Absolute 0.42 0.00 - 1.00 10*3/mm3    Eosinophils, Absolute 0.10 0.00 - 0.30 10*3/mm3    Basophils, Absolute 0.03 0.00 - 0.20 10*3/mm3    Immature Grans, Absolute 0.02 0.00 - 0.03 10*3/mm3   POC Troponin, Rapid    Collection Time: 12/07/18  2:14 AM   Result Value Ref Range    Troponin I 0.01 0.00 - 0.07 ng/mL     Note: In addition to lab results from this visit, the labs listed above may  include labs taken at another facility or during a different encounter within the last 24 hours. Please correlate lab times with ED admission and discharge times for further clarification of the services performed during this visit.    XR Chest 1 View   Final Result   No acute cardiopulmonary disease.       THIS DOCUMENT HAS BEEN ELECTRONICALLY SIGNED BY DARIANA KNOWLES MD        Vitals:    12/07/18 0145 12/07/18 0236 12/07/18 0333 12/07/18 0451   BP: 141/84  129/75 126/81   BP Location:       Patient Position:       Pulse:  65 63 68   Resp:    16   Temp:       TempSrc:       SpO2: 96% 96% 96% 98%   Weight:       Height:         Medications   sodium chloride 0.9 % flush 10 mL (not administered)   sodium chloride 0.9 % bolus 500 mL (0 mL Intravenous Stopped 12/7/18 0407)   ketorolac (TORADOL) injection 15 mg (15 mg Intravenous Given 12/7/18 0331)   sodium chloride 0.9 % bolus 1,000 mL (0 mL Intravenous Stopped 12/7/18 0450)     ECG/EMG Results (last 24 hours)     Procedure Component Value Units Date/Time    ECG 12 Lead [[id number]] Collected:  12/06/18 2332     Updated:  12/06/18 2333    ECG 12 Lead [[id number]] Collected:  12/07/18 0144     Updated:  12/07/18 0143            MDM    Final diagnoses:   Abdominal pain, unspecified abdominal location   Chest pain, unspecified type   Nonintractable headache, unspecified chronicity pattern, unspecified headache type   Myalgia   Diarrhea, unspecified type   Elevated CK   Non-traumatic rhabdomyolysis       Documentation assistance provided by radha Fish.  Information recorded by the radha was done at my direction and has been verified and validated by me.     Jose G Fish  12/07/18 0135       Mik Mullins MD  12/07/18 8204

## 2018-12-10 ENCOUNTER — TELEPHONE (OUTPATIENT)
Dept: URGENT CARE | Facility: CLINIC | Age: 28
End: 2018-12-10

## 2018-12-10 NOTE — TELEPHONE ENCOUNTER
Pt says he is feel slightly better. He is aware of his lab results. Went to the [facility] and has an appointment set up with a rheumatologist in a couple of weeks.

## 2018-12-19 DIAGNOSIS — R53.1 GENERALIZED WEAKNESS: ICD-10-CM

## 2018-12-19 DIAGNOSIS — R74.8 ELEVATED CREATINE KINASE LEVEL: ICD-10-CM

## 2018-12-19 DIAGNOSIS — R25.3 MUSCLE TWITCH: Primary | ICD-10-CM

## 2019-01-14 ENCOUNTER — TELEPHONE (OUTPATIENT)
Dept: INTERNAL MEDICINE | Facility: CLINIC | Age: 29
End: 2019-01-14

## 2019-01-14 ENCOUNTER — OFFICE VISIT (OUTPATIENT)
Dept: INTERNAL MEDICINE | Facility: CLINIC | Age: 29
End: 2019-01-14

## 2019-01-14 VITALS
DIASTOLIC BLOOD PRESSURE: 80 MMHG | OXYGEN SATURATION: 98 % | HEART RATE: 73 BPM | TEMPERATURE: 98 F | RESPIRATION RATE: 16 BRPM | WEIGHT: 293 LBS | BODY MASS INDEX: 39.74 KG/M2 | SYSTOLIC BLOOD PRESSURE: 130 MMHG

## 2019-01-14 DIAGNOSIS — R73.9 HYPERGLYCEMIA: Primary | ICD-10-CM

## 2019-01-14 LAB — HBA1C MFR BLD: 5.7 %

## 2019-01-14 PROCEDURE — 83036 HEMOGLOBIN GLYCOSYLATED A1C: CPT | Performed by: FAMILY MEDICINE

## 2019-01-14 PROCEDURE — 99213 OFFICE O/P EST LOW 20 MIN: CPT | Performed by: FAMILY MEDICINE

## 2019-01-14 RX ORDER — GABAPENTIN 300 MG/1
300 CAPSULE ORAL 2 TIMES DAILY
Refills: 2 | COMMUNITY
Start: 2019-01-07 | End: 2020-11-19

## 2019-01-14 RX ORDER — DULOXETIN HYDROCHLORIDE 30 MG/1
30 CAPSULE, DELAYED RELEASE ORAL DAILY
Refills: 3 | COMMUNITY
Start: 2018-12-26 | End: 2019-10-30

## 2019-01-14 RX ORDER — ERGOCALCIFEROL 1.25 MG/1
CAPSULE ORAL
Refills: 0 | COMMUNITY
Start: 2019-01-03 | End: 2019-07-10

## 2019-01-14 NOTE — TELEPHONE ENCOUNTER
Patient scheduled appt for later today for possible blood sugar problems, however he was wondering if he needed to be fasting, please call if he needs to reschedule for when he is fasting.  Phone # [phone]

## 2019-01-14 NOTE — PROGRESS NOTES
Subjective    Britton Vogt Jr. is a 28 y.o. male here for:  Chief Complaint   Patient presents with   • Follow-up     A1C       History of Present Illness   Patient comes in today to discuss hyperglycemia.  He's had some high sugars and is worried he could have diabetes.  He continues to have the odd symptoms of weakness, muscle cramps, and some tingling in the extremities.  He has seen rheumatology and is scheduled to see neurology.  Also being followed by cardiology.  Blood pressures have been running a little bit high.  Patient is excited as he has a baby boy that we'll be due in the spring, girlfriend is about 19 weeks along.    The following portions of the patient's history were reviewed and updated as appropriate: allergies, current medications, past family history, past medical history, past social history, past surgical history and problem list.    Health Maintenance   Topic Date Due   • ANNUAL PHYSICAL  08/14/1995   • HEPATITIS A VACCINE ADULT (1 of 2) 08/14/2010   • TDAP/TD VACCINES (1 - Tdap) 08/14/2011   • INFLUENZA VACCINE  08/01/2018       Review of Systems   Constitutional: Positive for fatigue.   Musculoskeletal: Positive for myalgias.   Neurological: Positive for weakness and numbness.       Vitals:    01/14/19 1609   BP: 130/80   Pulse: 73   Resp: 16   Temp: 98 °F (36.7 °C)   TempSrc: Temporal   SpO2: 98%   Weight: 133 kg (293 lb)         Objective   Physical Exam   Constitutional: He is oriented to person, place, and time. Vital signs are normal. He appears well-developed and well-nourished. He is active.  Non-toxic appearance. He does not have a sickly appearance. He does not appear ill. No distress.   HENT:   Head: Normocephalic and atraumatic.   Right Ear: Hearing normal.   Left Ear: Hearing normal.   Nose: Nose normal.   Mouth/Throat: Mucous membranes are not dry.   Eyes: EOM are normal. No scleral icterus.   Neck: Phonation normal. Neck supple.   Pulmonary/Chest: Effort normal.    Neurological: He is alert and oriented to person, place, and time. He displays no tremor. No cranial nerve deficit.   Skin: Skin is warm. He is not diaphoretic. No cyanosis. No pallor.   Psychiatric: He has a normal mood and affect. His speech is normal and behavior is normal. Judgment and thought content normal. Cognition and memory are normal.   Nursing note and vitals reviewed.      Component      Latest Ref Rng & Units 2/3/2016 1/12/2017 12/21/2017 2/1/2018   Glucose      70 - 100 mg/dL 107 (H) 89 130 (H) 93     Component      Latest Ref Rng & Units 6/5/2018 8/1/2018 12/7/2018   Glucose      70 - 100 mg/dL 90 82 123 (H)     Lab Results   Component Value Date    HGBA1C 5.7 01/14/2019         Assessment/Plan     Problem List Items Addressed This Visit     None      Visit Diagnoses     Hyperglycemia    -  Primary    Relevant Orders    POC Glycosylated Hemoglobin (Hb A1C)          · I reassured Mr. Vogt that he is not diabetic.  Needs follow up with all specialists as scheduled, especially neurology given his chronic symptoms.    No Follow-up on file.    Eliza Mayberry MD

## 2019-01-14 NOTE — TELEPHONE ENCOUNTER
Patient stated at his last appointment he had to be fasting to get his sugar checked and he wanted to know if he needed to do that today since he is being seen for blood sugar issues. Please advise

## 2019-01-17 PROBLEM — E55.9 VITAMIN D DEFICIENCY: Status: ACTIVE | Noted: 2019-01-17

## 2019-07-10 ENCOUNTER — OFFICE VISIT (OUTPATIENT)
Dept: INTERNAL MEDICINE | Facility: CLINIC | Age: 29
End: 2019-07-10

## 2019-07-10 VITALS
WEIGHT: 306 LBS | BODY MASS INDEX: 41.45 KG/M2 | SYSTOLIC BLOOD PRESSURE: 130 MMHG | HEIGHT: 72 IN | HEART RATE: 89 BPM | OXYGEN SATURATION: 98 % | TEMPERATURE: 97.5 F | DIASTOLIC BLOOD PRESSURE: 98 MMHG

## 2019-07-10 DIAGNOSIS — M54.50 CHRONIC MIDLINE LOW BACK PAIN WITHOUT SCIATICA: Primary | ICD-10-CM

## 2019-07-10 DIAGNOSIS — M54.16 LUMBAR RADICULOPATHY: ICD-10-CM

## 2019-07-10 DIAGNOSIS — G89.29 CHRONIC MIDLINE LOW BACK PAIN WITHOUT SCIATICA: Primary | ICD-10-CM

## 2019-07-10 DIAGNOSIS — R20.2 PARESTHESIA: ICD-10-CM

## 2019-07-10 PROCEDURE — 99213 OFFICE O/P EST LOW 20 MIN: CPT | Performed by: PHYSICIAN ASSISTANT

## 2019-07-10 RX ORDER — OMEPRAZOLE 20 MG/1
20 CAPSULE, DELAYED RELEASE ORAL EVERY MORNING
Refills: 3 | COMMUNITY
Start: 2019-06-14 | End: 2020-04-20

## 2019-07-10 RX ORDER — AMITRIPTYLINE HYDROCHLORIDE 25 MG/1
25 TABLET, FILM COATED ORAL NIGHTLY
COMMUNITY
End: 2020-05-04 | Stop reason: ALTCHOICE

## 2019-07-10 NOTE — PROGRESS NOTES
"Chief Complaint   Patient presents with   • Numbness     Patient states he noticed about 3 months ago some numbness and tingling in his right foot and toes, patient also states he has had some pain in his right calf as well but the pain come and goes. Patoient also states the pain is really severe but seems to ease up the longer her is on his feet.        Subjective   Britton Vogt Jr. is a 28 y.o. male    History of Present Illness     Patient today with c/o right foot pain.  He reports that this has been occurring daily.  He reports that the pain is worse in the morning and improve throughout the day.  He does not associate the pain with pressure to the extremity.  There is some associated numbness and tingling as well to the last 3 toes.  Intermittent pain to the right LE as well.  He reports that this pain mainly occurs to the high point of his calf and lower hamstring.  History significant for chronic low back pain due to bulging lumbar disc.  There has been associated radiculopathy.  No edema noted.  Denies any trauma to the foot.  He does take gabapentin for muscle twitching.  No history diabetes.  PMH significant for rhabdomyolysis and chronic elevation of CK.  He is followed by neurology.  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  He reports that he had MRI at the beginning of June.  He was told he had \"white matter spots\" on his brain.  He has not yet had office follow up to discuss the results. He continue to have chronic vague symptoms of myalgias, headaches, GI upset.       Past Medical History:   Diagnosis Date   • Anxiety    • Back pain    • Depression    • Elevated blood pressure reading    • Elevated CK    • Epididymitis    • Hyporeflexia    • Rhabdomyolysis      Past Surgical History:   Procedure Laterality Date   • WISDOM TOOTH EXTRACTION       Family History   Problem Relation Age of Onset   • Diabetes Mother    • Hypertension Mother    • Stroke Mother    • Depression " Maternal Uncle    • Stroke Maternal Uncle    • Depression Maternal Grandmother    • Stroke Other    • Diabetes Other    • Cancer Other    • Diabetes Other    • Cancer Other    • Diabetes Other    • Cancer Other    • Heart failure Sister 41     Social History     Socioeconomic History   • Marital status: Single     Spouse name: Not on file   • Number of children: Not on file   • Years of education: Not on file   • Highest education level: Not on file   Tobacco Use   • Smoking status: Never Smoker   • Smokeless tobacco: Never Used   Substance and Sexual Activity   • Alcohol use: No     Frequency: Never   • Drug use: No   • Sexual activity: Defer     No Known Allergies      Review of Systems   Constitutional: Negative for chills, fatigue and fever.   Gastrointestinal: Positive for nausea (chronic).   Musculoskeletal: Positive for arthralgias and myalgias (chronic). Negative for joint swelling.   Skin: Negative for rash.   Neurological: Positive for headache (chronic).     Objective     Vitals:    07/10/19 1515   BP: 130/98   Pulse: 89   Temp: 97.5 °F (36.4 °C)   SpO2: 98%       Physical Exam   Constitutional: He is oriented to person, place, and time. He appears well-developed and well-nourished. No distress.   HENT:   Head: Normocephalic and atraumatic.   Eyes: Conjunctivae and EOM are normal. Pupils are equal, round, and reactive to light.   Neck: Normal range of motion. Neck supple.   Cardiovascular: Normal rate, regular rhythm and normal heart sounds. Exam reveals no gallop and no friction rub.   No murmur heard.  Pulmonary/Chest: Effort normal and breath sounds normal. No respiratory distress. He has no wheezes. He has no rales.   Musculoskeletal: He exhibits tenderness. He exhibits no edema or deformity.        Lumbar back: He exhibits tenderness. He exhibits no bony tenderness and no swelling.   Pain with palpation to the bottom of the right foot.     Neurological: He is alert and oriented to person, place, and  time.   Skin: Skin is warm and dry. Capillary refill takes less than 2 seconds. He is not diaphoretic.   Psychiatric: He has a normal mood and affect. His behavior is normal.   Nursing note and vitals reviewed.    Assessment/Plan     Britton was seen today for numbness.    Diagnoses and all orders for this visit:    Lumbar radiculopathy  Paresthesia  Chronic midline low back pain without sciatica  -     MRI Lumbar Spine Without Contrast          Will proceed with MRI lumbar spine, as patient has history of bulging lumbar disc.  Symptoms could be related to worsening disc herniation.  He is being followed by neurology for chronic elevation in CK.  Recent MRI has not yet been discussed in office with neurology.  Potential for current symptoms to be neurologically related.   Concern for Multiple sclerosis contributing to symptoms.  Have advised patient to call neurology tomorrow to set up appointment for follow up.  RTC if symptoms are worsening. Will contact patient with results of MRI when available.     Return if symptoms worsen or fail to improve, for Next scheduled follow up.    Josefina Richards PA-C

## 2019-10-14 ENCOUNTER — TRANSCRIBE ORDERS (OUTPATIENT)
Dept: PULMONOLOGY | Facility: HOSPITAL | Age: 29
End: 2019-10-14

## 2019-10-14 DIAGNOSIS — G47.33 OBSTRUCTIVE SLEEP APNEA (ADULT) (PEDIATRIC): Primary | ICD-10-CM

## 2019-10-30 ENCOUNTER — HOSPITAL ENCOUNTER (OUTPATIENT)
Dept: SLEEP MEDICINE | Facility: HOSPITAL | Age: 29
Discharge: HOME | End: 2019-10-30
Admitting: INTERNAL MEDICINE

## 2019-10-30 VITALS
HEART RATE: 77 BPM | WEIGHT: 315 LBS | SYSTOLIC BLOOD PRESSURE: 148 MMHG | RESPIRATION RATE: 16 BRPM | HEIGHT: 72 IN | OXYGEN SATURATION: 93 % | DIASTOLIC BLOOD PRESSURE: 72 MMHG | BODY MASS INDEX: 42.66 KG/M2

## 2019-10-30 DIAGNOSIS — G47.33 OBSTRUCTIVE SLEEP APNEA (ADULT) (PEDIATRIC): ICD-10-CM

## 2019-10-30 PROCEDURE — 95806 SLEEP STUDY UNATT&RESP EFFT: CPT

## 2020-02-11 ENCOUNTER — APPOINTMENT (OUTPATIENT)
Dept: GENERAL RADIOLOGY | Facility: HOSPITAL | Age: 30
End: 2020-02-11

## 2020-02-11 ENCOUNTER — HOSPITAL ENCOUNTER (EMERGENCY)
Facility: HOSPITAL | Age: 30
Discharge: HOME | End: 2020-02-11
Attending: EMERGENCY MEDICINE | Admitting: EMERGENCY MEDICINE

## 2020-02-11 VITALS
TEMPERATURE: 98 F | BODY MASS INDEX: 42.66 KG/M2 | WEIGHT: 315 LBS | OXYGEN SATURATION: 97 % | DIASTOLIC BLOOD PRESSURE: 76 MMHG | HEART RATE: 84 BPM | RESPIRATION RATE: 8 BRPM | HEIGHT: 72 IN | SYSTOLIC BLOOD PRESSURE: 125 MMHG

## 2020-02-11 DIAGNOSIS — R73.09 ELEVATED GLUCOSE: ICD-10-CM

## 2020-02-11 DIAGNOSIS — R05.9 COUGH IN ADULT PATIENT: Primary | ICD-10-CM

## 2020-02-11 LAB
ALBUMIN SERPL-MCNC: 4.2 G/DL (ref 3.5–5.2)
ALBUMIN/GLOB SERPL: 1.4 G/DL
ALP SERPL-CCNC: 72 U/L (ref 39–117)
ALT SERPL W P-5'-P-CCNC: 37 U/L (ref 1–41)
ANION GAP SERPL CALCULATED.3IONS-SCNC: 10 MMOL/L (ref 5–15)
AST SERPL-CCNC: 35 U/L (ref 1–40)
BACTERIA UR QL AUTO: NORMAL /HPF
BASOPHILS # BLD AUTO: 0.03 10*3/MM3 (ref 0–0.2)
BASOPHILS NFR BLD AUTO: 0.5 % (ref 0–1.5)
BILIRUB SERPL-MCNC: 0.2 MG/DL (ref 0.2–1.2)
BILIRUB UR QL STRIP: NEGATIVE
BUN BLD-MCNC: 17 MG/DL (ref 6–20)
BUN/CREAT SERPL: 17.7 (ref 7–25)
CALCIUM SPEC-SCNC: 8.8 MG/DL (ref 8.6–10.5)
CHLORIDE SERPL-SCNC: 102 MMOL/L (ref 98–107)
CLARITY UR: ABNORMAL
CO2 SERPL-SCNC: 26 MMOL/L (ref 22–29)
COLOR UR: YELLOW
CREAT BLD-MCNC: 0.96 MG/DL (ref 0.76–1.27)
DEPRECATED RDW RBC AUTO: 41.4 FL (ref 37–54)
EOSINOPHIL # BLD AUTO: 0.07 10*3/MM3 (ref 0–0.4)
EOSINOPHIL NFR BLD AUTO: 1.2 % (ref 0.3–6.2)
ERYTHROCYTE [DISTWIDTH] IN BLOOD BY AUTOMATED COUNT: 12.7 % (ref 12.3–15.4)
FLUAV AG NPH QL: NEGATIVE
FLUBV AG NPH QL IA: NEGATIVE
GFR SERPL CREATININE-BSD FRML MDRD: 114 ML/MIN/1.73
GLOBULIN UR ELPH-MCNC: 2.9 GM/DL
GLUCOSE BLD-MCNC: 142 MG/DL (ref 65–99)
GLUCOSE UR STRIP-MCNC: NEGATIVE MG/DL
HCT VFR BLD AUTO: 42 % (ref 37.5–51)
HGB BLD-MCNC: 14.2 G/DL (ref 13–17.7)
HGB UR QL STRIP.AUTO: NEGATIVE
HOLD SPECIMEN: NORMAL
HYALINE CASTS UR QL AUTO: NORMAL /LPF
IMM GRANULOCYTES # BLD AUTO: 0.01 10*3/MM3 (ref 0–0.05)
IMM GRANULOCYTES NFR BLD AUTO: 0.2 % (ref 0–0.5)
KETONES UR QL STRIP: NEGATIVE
LEUKOCYTE ESTERASE UR QL STRIP.AUTO: NEGATIVE
LYMPHOCYTES # BLD AUTO: 2.46 10*3/MM3 (ref 0.7–3.1)
LYMPHOCYTES NFR BLD AUTO: 42.2 % (ref 19.6–45.3)
MCH RBC QN AUTO: 30 PG (ref 26.6–33)
MCHC RBC AUTO-ENTMCNC: 33.8 G/DL (ref 31.5–35.7)
MCV RBC AUTO: 88.6 FL (ref 79–97)
MONOCYTES # BLD AUTO: 0.57 10*3/MM3 (ref 0.1–0.9)
MONOCYTES NFR BLD AUTO: 9.8 % (ref 5–12)
NEUTROPHILS # BLD AUTO: 2.69 10*3/MM3 (ref 1.7–7)
NEUTROPHILS NFR BLD AUTO: 46.1 % (ref 42.7–76)
NITRITE UR QL STRIP: NEGATIVE
NRBC BLD AUTO-RTO: 0 /100 WBC (ref 0–0.2)
NT-PROBNP SERPL-MCNC: <5 PG/ML (ref 5–450)
PH UR STRIP.AUTO: <=5 [PH] (ref 5–8)
PLATELET # BLD AUTO: 206 10*3/MM3 (ref 140–450)
PMV BLD AUTO: 10.8 FL (ref 6–12)
POTASSIUM BLD-SCNC: 3.9 MMOL/L (ref 3.5–5.2)
PROT SERPL-MCNC: 7.1 G/DL (ref 6–8.5)
PROT UR QL STRIP: NEGATIVE
RBC # BLD AUTO: 4.74 10*6/MM3 (ref 4.14–5.8)
RBC # UR: NORMAL /HPF
REF LAB TEST METHOD: NORMAL
SODIUM BLD-SCNC: 138 MMOL/L (ref 136–145)
SP GR UR STRIP: 1.03 (ref 1–1.03)
SQUAMOUS #/AREA URNS HPF: NORMAL /HPF
TROPONIN T SERPL-MCNC: <0.01 NG/ML (ref 0–0.03)
UROBILINOGEN UR QL STRIP: ABNORMAL
WBC NRBC COR # BLD: 5.83 10*3/MM3 (ref 3.4–10.8)
WBC UR QL AUTO: NORMAL /HPF

## 2020-02-11 PROCEDURE — 84484 ASSAY OF TROPONIN QUANT: CPT | Performed by: EMERGENCY MEDICINE

## 2020-02-11 PROCEDURE — 87804 INFLUENZA ASSAY W/OPTIC: CPT | Performed by: EMERGENCY MEDICINE

## 2020-02-11 PROCEDURE — 99284 EMERGENCY DEPT VISIT MOD MDM: CPT

## 2020-02-11 PROCEDURE — 93005 ELECTROCARDIOGRAM TRACING: CPT

## 2020-02-11 PROCEDURE — 83880 ASSAY OF NATRIURETIC PEPTIDE: CPT | Performed by: EMERGENCY MEDICINE

## 2020-02-11 PROCEDURE — 93005 ELECTROCARDIOGRAM TRACING: CPT | Performed by: EMERGENCY MEDICINE

## 2020-02-11 PROCEDURE — 71045 X-RAY EXAM CHEST 1 VIEW: CPT

## 2020-02-11 PROCEDURE — 81001 URINALYSIS AUTO W/SCOPE: CPT | Performed by: EMERGENCY MEDICINE

## 2020-02-11 PROCEDURE — 85025 COMPLETE CBC W/AUTO DIFF WBC: CPT | Performed by: EMERGENCY MEDICINE

## 2020-02-11 PROCEDURE — 80053 COMPREHEN METABOLIC PANEL: CPT | Performed by: EMERGENCY MEDICINE

## 2020-02-11 RX ORDER — ALBUTEROL SULFATE 90 UG/1
2 AEROSOL, METERED RESPIRATORY (INHALATION) ONCE
Status: COMPLETED | OUTPATIENT
Start: 2020-02-11 | End: 2020-02-11

## 2020-02-11 RX ORDER — IPRATROPIUM BROMIDE 21 UG/1
2 SPRAY, METERED NASAL EVERY 12 HOURS
Qty: 30 ML | Refills: 0 | Status: SHIPPED | OUTPATIENT
Start: 2020-02-11 | End: 2020-04-20

## 2020-02-11 RX ADMIN — ALBUTEROL SULFATE 2 PUFF: 90 AEROSOL, METERED RESPIRATORY (INHALATION) at 08:35

## 2020-02-13 PROBLEM — Z14.8 GENETIC CARRIER OF OTHER DISEASE: Status: ACTIVE | Noted: 2020-02-13

## 2020-04-20 ENCOUNTER — TELEMEDICINE (OUTPATIENT)
Dept: INTERNAL MEDICINE | Facility: CLINIC | Age: 30
End: 2020-04-20

## 2020-04-20 DIAGNOSIS — K21.9 GASTROESOPHAGEAL REFLUX DISEASE WITHOUT ESOPHAGITIS: ICD-10-CM

## 2020-04-20 DIAGNOSIS — R05.3 CHRONIC COUGH: Primary | ICD-10-CM

## 2020-04-20 DIAGNOSIS — F33.9 EPISODE OF RECURRENT MAJOR DEPRESSIVE DISORDER, UNSPECIFIED DEPRESSION EPISODE SEVERITY (HCC): ICD-10-CM

## 2020-04-20 DIAGNOSIS — J02.9 SORE THROAT: ICD-10-CM

## 2020-04-20 PROCEDURE — 99214 OFFICE O/P EST MOD 30 MIN: CPT | Performed by: FAMILY MEDICINE

## 2020-04-20 RX ORDER — FLUOXETINE HYDROCHLORIDE 20 MG/1
20 CAPSULE ORAL DAILY
Qty: 90 CAPSULE | Refills: 3 | Status: SHIPPED | OUTPATIENT
Start: 2020-04-20 | End: 2020-11-19

## 2020-04-20 RX ORDER — PANTOPRAZOLE SODIUM 40 MG/1
40 TABLET, DELAYED RELEASE ORAL NIGHTLY
Qty: 90 TABLET | Refills: 3 | Status: SHIPPED | OUTPATIENT
Start: 2020-04-20 | End: 2020-11-19

## 2020-04-20 NOTE — PROGRESS NOTES
You have chosen to receive care through a telehealth visit.  Do you consent to use a video/audio connection for your medical care today? Yes    On this video today will be Britton Vogt and Eliza Mayberry MD

## 2020-04-20 NOTE — PROGRESS NOTES
Subjective    Britton Vogt Jr. is a 29 y.o. male here for:  Chief Complaint   Patient presents with   • Cough   • Sore Throat       Patient presents for a virtual visit. This visit was scheduled as a video visit to comply with patient safety concerns in accordance with CDC recommendations.     History of Present Illness   Pt sick in February, went to [facility] and diagnosed with bronchitis. He took a pill pack (sounds like azithromycin) and was given an inhaler but he continued to have a cough and sore throat. He went to the ER 2/11/20 for this as well. Continues to have cough which is worse at night it seems. Has some sharp pains in chest at times with the cough. Throat is still getting sore, has nausea at times. Having issues with heartburn, despite PPI (omeprazole 20 mg). Underwent EGD last year and was put on medicine. He's tried to decrease acidic foods from diet, such as oranges and orange juice. Patient denies wheezing.     Also admits to mood issues, had issues when he was at [employer] playing football and was set up to have Counsellor and was on Vyvanse for a period, does not feel it helped. He has gotten down before and thinks he can get himself out of it and is hesitant to start medicine. Admits he does not really have anybody to talk to about how he feels. Pandemic has made things worse.     During today's visit, I reviewed the documented allergies, medications, chief complaint, and pertinent vitals.  I have confirmed with the patient that there have been no changes since this information was discussed with my clinical team member.    The following portions of the patient's history were reviewed and updated as appropriate: allergies, current medications, past family history, past medical history, past social history, past surgical history and problem list.    Review of Systems   Constitutional: Positive for activity change.   Gastrointestinal: Positive for GERD.   Psychiatric/Behavioral: Positive for depressed  mood and stress.       There were no vitals taken for this visit.      Objective   Nursing note reviewed.  General: healthy appearing, no distress.  HENT: no facial deformities, hearing is within normal limits   Eyes: EOM intact, no obvious nystagmus.  Neck: phonation normal. No stridor  Pulm: Normal work of breathing  Neuro: A&O x 3. No abnormal movements.  Skin: No rashes appreciated to face. No visible cyanosis  Psych: depressed mood, Insight normal. Judgement appropriate. Behavior normal. Memory normal.     Reviewed EGD report by Dr. Bolivar dated 1/23/19. Stated to continue omeprazole for nonerosive reflux.    Assessment/Plan     Problem List Items Addressed This Visit     None      Visit Diagnoses     Chronic cough    -  Primary    Relevant Medications    pantoprazole (Protonix) 40 MG EC tablet    Gastroesophageal reflux disease without esophagitis        Relevant Medications    pantoprazole (Protonix) 40 MG EC tablet    Sore throat        Relevant Medications    pantoprazole (Protonix) 40 MG EC tablet    Episode of recurrent major depressive disorder, unspecified depression episode severity (CMS/HCC)        Relevant Medications    FLUoxetine (PROzac) 20 MG capsule          · Set up follow up visit in 2 weeks to see how he's doing. Consider seeing therapy again. Trial SSRI, stop for si/hi. Changed PPI and increased dosage level, suspect cough related to uncontrolled GERD. If not improved next visit may need to consider further imaging.   · Visit completed through [email or web address]     Eliza Mayberry MD

## 2020-04-20 NOTE — PATIENT INSTRUCTIONS
Gastroesophageal Reflux Disease, Adult  Gastroesophageal reflux (PABLO) happens when acid from the stomach flows up into the tube that connects the mouth and the stomach (esophagus). Normally, food travels down the esophagus and stays in the stomach to be digested. However, when a person has PABLO, food and stomach acid sometimes move back up into the esophagus. If this becomes a more serious problem, the person may be diagnosed with a disease called gastroesophageal reflux disease (GERD). GERD occurs when the reflux:  · Happens often.  · Causes frequent or severe symptoms.  · Causes problems such as damage to the esophagus.  When stomach acid comes in contact with the esophagus, the acid may cause soreness (inflammation) in the esophagus. Over time, GERD may create small holes (ulcers) in the lining of the esophagus.  What are the causes?  This condition is caused by a problem with the muscle between the esophagus and the stomach (lower esophageal sphincter, or LES). Normally, the LES muscle closes after food passes through the esophagus to the stomach. When the LES is weakened or abnormal, it does not close properly, and that allows food and stomach acid to go back up into the esophagus.  The LES can be weakened by certain dietary substances, medicines, and medical conditions, including:  · Tobacco use.  · Pregnancy.  · Having a hiatal hernia.  · Alcohol use.  · Certain foods and beverages, such as coffee, chocolate, onions, and peppermint.  What increases the risk?  You are more likely to develop this condition if you:  · Have an increased body weight.  · Have a connective tissue disorder.  · Use NSAID medicines.  What are the signs or symptoms?  Symptoms of this condition include:  · Heartburn.  · Difficult or painful swallowing.  · The feeling of having a lump in the throat.  · A bitter taste in the mouth.  · Bad breath.  · Having a large amount of saliva.  · Having an upset or bloated  stomach.  · Belching.  · Chest pain. Different conditions can cause chest pain. Make sure you see your health care provider if you experience chest pain.  · Shortness of breath or wheezing.  · Ongoing (chronic) cough or a night-time cough.  · Wearing away of tooth enamel.  · Weight loss.  How is this diagnosed?  Your health care provider will take a medical history and perform a physical exam. To determine if you have mild or severe GERD, your health care provider may also monitor how you respond to treatment. You may also have tests, including:  · A test to examine your stomach and esophagus with a small camera (endoscopy).  · A test that measures the acidity level in your esophagus.  · A test that measures how much pressure is on your esophagus.  · A barium swallow or modified barium swallow test to show the shape, size, and functioning of your esophagus.  How is this treated?  The goal of treatment is to help relieve your symptoms and to prevent complications. Treatment for this condition may vary depending on how severe your symptoms are. Your health care provider may recommend:  · Changes to your diet.  · Medicine.  · Surgery.  Follow these instructions at home:  Eating and drinking    · Follow a diet as recommended by your health care provider. This may involve avoiding foods and drinks such as:  ? Coffee and tea (with or without caffeine).  ? Drinks that contain alcohol.  ? Energy drinks and sports drinks.  ? Carbonated drinks or sodas.  ? Chocolate and cocoa.  ? Peppermint and mint flavorings.  ? Garlic and onions.  ? Horseradish.  ? Spicy and acidic foods, including peppers, chili powder, hernandez powder, vinegar, hot sauces, and barbecue sauce.  ? Citrus fruit juices and citrus fruits, such as oranges, negro, and limes.  ? Tomato-based foods, such as red sauce, chili, salsa, and pizza with red sauce.  ? Fried and fatty foods, such as donuts, french fries, potato chips, and high-fat dressings.  ? High-fat  meats, such as hot dogs and fatty cuts of red and white meats, such as rib eye steak, sausage, ham, and douglas.  ? High-fat dairy items, such as whole milk, butter, and cream cheese.  · Eat small, frequent meals instead of large meals.  · Avoid drinking large amounts of liquid with your meals.  · Avoid eating meals during the 2-3 hours before bedtime.  · Avoid lying down right after you eat.  · Do not exercise right after you eat.  Lifestyle    · Do not use any products that contain nicotine or tobacco, such as cigarettes, e-cigarettes, and chewing tobacco. If you need help quitting, ask your health care provider.  · Try to reduce your stress by using methods such as yoga or meditation. If you need help reducing stress, ask your health care provider.  · If you are overweight, reduce your weight to an amount that is healthy for you. Ask your health care provider for guidance about a safe weight loss goal.  General instructions  · Pay attention to any changes in your symptoms.  · Take over-the-counter and prescription medicines only as told by your health care provider. Do not take aspirin, ibuprofen, or other NSAIDs unless your health care provider told you to do so.  · Wear loose-fitting clothing. Do not wear anything tight around your waist that causes pressure on your abdomen.  · Raise (elevate) the head of your bed about 6 inches (15 cm).  · Avoid bending over if this makes your symptoms worse.  · Keep all follow-up visits as told by your health care provider. This is important.  Contact a health care provider if:  · You have:  ? New symptoms.  ? Unexplained weight loss.  ? Difficulty swallowing or it hurts to swallow.  ? Wheezing or a persistent cough.  ? A hoarse voice.  · Your symptoms do not improve with treatment.  Get help right away if you:  · Have pain in your arms, neck, jaw, teeth, or back.  · Feel sweaty, dizzy, or light-headed.  · Have chest pain or shortness of breath.  · Vomit and your vomit looks  like blood or coffee grounds.  · Faint.  · Have stool that is bloody or black.  · Cannot swallow, drink, or eat.  Summary  · Gastroesophageal reflux happens when acid from the stomach flows up into the esophagus. GERD is a disease in which the reflux happens often, causes frequent or severe symptoms, or causes problems such as damage to the esophagus.  · Treatment for this condition may vary depending on how severe your symptoms are. Your health care provider may recommend diet and lifestyle changes, medicine, or surgery.  · Contact a health care provider if you have new or worsening symptoms.  · Take over-the-counter and prescription medicines only as told by your health care provider. Do not take aspirin, ibuprofen, or other NSAIDs unless your health care provider told you to do so.  · Keep all follow-up visits as told by your health care provider. This is important.  This information is not intended to replace advice given to you by your health care provider. Make sure you discuss any questions you have with your health care provider.  Document Released: 09/27/2006 Document Revised: 06/26/2019 Document Reviewed: 06/26/2019  [employer] Interactive Patient Education © 2020 [employer]

## 2020-05-04 ENCOUNTER — TELEMEDICINE (OUTPATIENT)
Dept: INTERNAL MEDICINE | Facility: CLINIC | Age: 30
End: 2020-05-04

## 2020-05-04 DIAGNOSIS — K21.9 GASTROESOPHAGEAL REFLUX DISEASE WITHOUT ESOPHAGITIS: ICD-10-CM

## 2020-05-04 DIAGNOSIS — F33.9 EPISODE OF RECURRENT MAJOR DEPRESSIVE DISORDER, UNSPECIFIED DEPRESSION EPISODE SEVERITY (HCC): ICD-10-CM

## 2020-05-04 DIAGNOSIS — R05.3 CHRONIC COUGH: Primary | ICD-10-CM

## 2020-05-04 PROCEDURE — 99214 OFFICE O/P EST MOD 30 MIN: CPT | Performed by: FAMILY MEDICINE

## 2020-05-04 RX ORDER — FLUTICASONE PROPIONATE 50 MCG
2 SPRAY, SUSPENSION (ML) NASAL DAILY
Qty: 3 BOTTLE | Refills: 4 | Status: SHIPPED | OUTPATIENT
Start: 2020-05-04 | End: 2020-11-19

## 2020-05-04 NOTE — PROGRESS NOTES
"Subjective    Britton Vogt Jr. is a 29 y.o. male here for:  Chief Complaint   Patient presents with   • Depression     Mood is better with Prozac   • Cough     Cough is better, just comes occasionally.        Patient presents for a virtual visit. This visit was scheduled as a video visit to comply with patient safety concerns in accordance with CDC recommendations.     History of Present Illness   Mood is better with Prozac. Some issues with increased stools but overall feels medicine helping.    Cough improved for a few days with addition of PPI but then returned. Associated with sore throat. Cough brought on by laughing or deep breathing. No wheezing. No fevers or chills but patient notes other day he broke out in a sweat in [employer] for no reason. He feels breathing itself is normal. Cough described as dry and \"aggressive\". He's also had some pain in both ears and feels tired. No loss of taste or smell. 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 Wears mask.     Also mentions MRI brain he had last summer, was told there were \"spots\" on his brain but was told it was nothing to worry about by the neurologist he was seeing. He is concerned as he continues to overall physically not feel well.     During today's visit, I reviewed the documented allergies, medications, chief complaint, and pertinent vitals.  I have confirmed with the patient that there have been no changes since this information was discussed with my clinical team member.    The following portions of the patient's history were reviewed and updated as appropriate: allergies, current medications, past family history, past medical history, past social history, past surgical history and problem list.    Review of Systems   Constitutional: Positive for fatigue.   Respiratory: Positive for cough. Negative for shortness of breath.    Gastrointestinal: Positive for indigestion (\"weird taste\" in mouth at times).       There were no vitals " taken for this visit.      Objective   Nursing note reviewed.  General: healthy appearing, no distress.  HENT: no facial deformities, hearing is within normal limits   Eyes: EOM intact, no obvious nystagmus.  Neck: phonation normal. No stridor  Pulm: Normal work of breathing  Neuro: A&O x 3. No abnormal movements.  Skin: No rashes appreciated to face. No visible cyanosis  Psych: Mood and affect appropriate. Insight normal. Judgement appropriate. Behavior normal. Memory normal.     Reviewed MRI brain 6/6/19 (report requested and reviewed after video visit). Ordered by Dr. Palomo. Couple of tiny foci of increased T2 signal in white matter, nonspecific and not felt to be of clinical significance. May be related to patient's history of chronic headaches per report. Some mucosal thickening to sinuses.    Assessment/Plan     Problem List Items Addressed This Visit     None      Visit Diagnoses     Chronic cough    -  Primary    Relevant Medications    fluticasone (FLONASE) 50 MCG/ACT nasal spray    Other Relevant Orders    Full Pulmonary Function Test With Bronchodilator    Gastroesophageal reflux disease without esophagitis        Episode of recurrent major depressive disorder, unspecified depression episode severity (CMS/HCC)              · Continue PPI, adding nose spray. If cough resolves can cancel PFTs. Discussed possible CT scan or ENT referral if this continues  · Continue Prozac for mood, encouraged staying on med at least six months   · Messaged patient regarding neurology, possible second opinion.    Eliza Mayberry MD

## 2020-05-18 ENCOUNTER — OFFICE VISIT (OUTPATIENT)
Dept: PREADMISSION TESTING | Facility: HOSPITAL | Age: 30
End: 2020-05-18

## 2020-05-18 PROCEDURE — C9803 HOPD COVID-19 SPEC COLLECT: HCPCS

## 2020-05-18 PROCEDURE — U0002 COVID-19 LAB TEST NON-CDC: HCPCS | Performed by: INTERNAL MEDICINE

## 2020-05-18 PROCEDURE — U0004 COV-19 TEST NON-CDC HGH THRU: HCPCS | Performed by: INTERNAL MEDICINE

## 2020-05-19 LAB
REF LAB TEST METHOD: NORMAL
SARS-COV-2 RNA RESP QL NAA+PROBE: NOT DETECTED

## 2020-05-20 ENCOUNTER — HOSPITAL ENCOUNTER (OUTPATIENT)
Dept: PULMONOLOGY | Facility: HOSPITAL | Age: 30
Discharge: HOME | End: 2020-05-20
Admitting: FAMILY MEDICINE

## 2020-05-20 DIAGNOSIS — R05.3 CHRONIC COUGH: ICD-10-CM

## 2020-05-20 PROCEDURE — 94010 BREATHING CAPACITY TEST: CPT | Performed by: INTERNAL MEDICINE

## 2020-05-20 PROCEDURE — 94727 GAS DIL/WSHOT DETER LNG VOL: CPT

## 2020-05-20 PROCEDURE — 94727 GAS DIL/WSHOT DETER LNG VOL: CPT | Performed by: INTERNAL MEDICINE

## 2020-05-20 PROCEDURE — 94010 BREATHING CAPACITY TEST: CPT

## 2020-05-20 PROCEDURE — 94729 DIFFUSING CAPACITY: CPT | Performed by: INTERNAL MEDICINE

## 2020-05-20 PROCEDURE — 94729 DIFFUSING CAPACITY: CPT

## 2020-06-04 ENCOUNTER — TELEPHONE (OUTPATIENT)
Dept: INTERNAL MEDICINE | Facility: CLINIC | Age: 30
End: 2020-06-04

## 2020-06-04 NOTE — TELEPHONE ENCOUNTER
Spoke with Violet and she asked the patient's PFT be faxed to their office for review. Dr. Pulido would also like to speak with Dr. Mayberry when she gets a chance. Advised Dr. Mayberry is in with a patient right now, but I will relay the message.

## 2020-06-09 NOTE — TELEPHONE ENCOUNTER
Dr. Pulido is requesting a phone call from dr. Mayberry. He will be in office all day today. He can be reached at [phone].

## 2020-06-10 ENCOUNTER — TELEPHONE (OUTPATIENT)
Dept: INTERNAL MEDICINE | Facility: CLINIC | Age: 30
End: 2020-06-10

## 2020-06-10 NOTE — TELEPHONE ENCOUNTER
"----- Message from Tawny Mckeon MA sent at 6/9/2020 11:52 AM EDT -----  Contact: Violet - Dr. Pulido's Office      ----- Message -----  From: Roberta Begum  Sent: 6/9/2020  11:32 AM EDT  To: Tawny Mckeon MA    Per VM \"Hi this is Violet with Dr. Pulido's office. We were calling again. We had left a message last week for Dr. Eliza Mayberry to call Dr. Pulido regarding Vipul Vogt Junior and the patient's date of birth is [birth year]. Dr. Pulido is in the office the rest of the day today and we were calling back to see if Dr. Mayberry could return his call. Our no. is [phone]. Thank you.\"  "

## 2020-07-17 ENCOUNTER — TELEPHONE (OUTPATIENT)
Dept: INTERNAL MEDICINE | Facility: CLINIC | Age: 30
End: 2020-07-17

## 2020-07-17 NOTE — TELEPHONE ENCOUNTER
Josefina Richards ordered a MRI of Lumbar Spine on 07/11/2019 and it has never been completed. Can I cancel this order?

## 2020-11-19 ENCOUNTER — OFFICE VISIT (OUTPATIENT)
Dept: NEUROLOGY | Facility: CLINIC | Age: 30
End: 2020-11-19

## 2020-11-19 ENCOUNTER — LAB (OUTPATIENT)
Dept: LAB | Facility: HOSPITAL | Age: 30
End: 2020-11-19

## 2020-11-19 VITALS
DIASTOLIC BLOOD PRESSURE: 88 MMHG | HEART RATE: 59 BPM | SYSTOLIC BLOOD PRESSURE: 118 MMHG | BODY MASS INDEX: 40.26 KG/M2 | WEIGHT: 297.2 LBS | OXYGEN SATURATION: 98 % | TEMPERATURE: 97.5 F | HEIGHT: 72 IN

## 2020-11-19 DIAGNOSIS — R74.8 ELEVATED CPK: ICD-10-CM

## 2020-11-19 DIAGNOSIS — G44.209 TENSION HEADACHE: ICD-10-CM

## 2020-11-19 DIAGNOSIS — R74.8 ELEVATED CPK: Primary | ICD-10-CM

## 2020-11-19 LAB
ANION GAP SERPL CALCULATED.3IONS-SCNC: 10.2 MMOL/L (ref 5–15)
BUN SERPL-MCNC: 10 MG/DL (ref 6–20)
BUN/CREAT SERPL: 10.3 (ref 7–25)
CALCIUM SPEC-SCNC: 9.6 MG/DL (ref 8.6–10.5)
CHLORIDE SERPL-SCNC: 105 MMOL/L (ref 98–107)
CK SERPL-CCNC: 1384 U/L (ref 20–200)
CO2 SERPL-SCNC: 26.8 MMOL/L (ref 22–29)
CREAT SERPL-MCNC: 0.97 MG/DL (ref 0.76–1.27)
GFR SERPL CREATININE-BSD FRML MDRD: 112 ML/MIN/1.73
GLUCOSE SERPL-MCNC: 93 MG/DL (ref 65–99)
LDH SERPL-CCNC: 259 U/L (ref 135–225)
POTASSIUM SERPL-SCNC: 4.1 MMOL/L (ref 3.5–5.2)
SODIUM SERPL-SCNC: 142 MMOL/L (ref 136–145)

## 2020-11-19 PROCEDURE — 82550 ASSAY OF CK (CPK): CPT

## 2020-11-19 PROCEDURE — 83615 LACTATE (LD) (LDH) ENZYME: CPT

## 2020-11-19 PROCEDURE — 99204 OFFICE O/P NEW MOD 45 MIN: CPT | Performed by: PSYCHIATRY & NEUROLOGY

## 2020-11-19 PROCEDURE — 82085 ASSAY OF ALDOLASE: CPT

## 2020-11-19 PROCEDURE — 36415 COLL VENOUS BLD VENIPUNCTURE: CPT

## 2020-11-19 PROCEDURE — 80048 BASIC METABOLIC PNL TOTAL CA: CPT

## 2020-11-19 RX ORDER — TOPIRAMATE 25 MG/1
25 TABLET ORAL 2 TIMES DAILY
Qty: 28 TABLET | Refills: 0 | Status: SHIPPED | OUTPATIENT
Start: 2020-11-19 | End: 2021-02-03

## 2020-11-19 RX ORDER — TOPIRAMATE 50 MG/1
50 TABLET, FILM COATED ORAL 2 TIMES DAILY
Qty: 28 TABLET | Refills: 0 | Status: SHIPPED | OUTPATIENT
Start: 2020-11-19 | End: 2021-02-03

## 2020-11-19 RX ORDER — SILDENAFIL CITRATE 20 MG/1
TABLET ORAL
COMMUNITY
Start: 2020-11-06 | End: 2022-04-06

## 2020-11-19 RX ORDER — TOPIRAMATE 100 MG/1
100 TABLET, FILM COATED ORAL 2 TIMES DAILY
Qty: 60 TABLET | Refills: 2 | Status: SHIPPED | OUTPATIENT
Start: 2020-11-19 | End: 2021-02-03

## 2020-11-19 NOTE — PROGRESS NOTES
Subjective:    CC: Britton Vogt  is seen today in consultation at the request of Demetri De Los Santos,* for Elevated CPK       HPI:  Patient is a 30-year-old male with past medical history of elevated CPK, headaches referred to clinic to establish care.  He reports that he has seen [facility] Dr. Palomo for a long time.  He reports that the symptoms started about 5-6 years ago where he started noticing some myalgia, muscle cramps, overall feeling fatigued and weakness.  He underwent lab testing and was noted to have elevated CPK. He also noted exercise intolerance and headaches.  He reports that his serum CPK levels were as high as in 1600 range.  He has been told in the past that is common to have elevated CPK in the -American population and that he has idiopathic hyperCKemia.  Dr. Palomo was concerned because of elevated CPK and constellation of symptoms and involvement of greater than 2 organ system that me may have underlying mitochondrial disorder.  He reports that he underwent genetic testing and it was positive.  He is not sure of the exact result though.  Today in clinic he reports that he is more concerned about the headaches present as he reports that the myalgias, muscle cramps have resolved on its own and he overall feels better.  He was also on gabapentin and amitriptyline which he decided to stop because of side effects and since stopping, he feels much better.  However currently he reports almost daily headaches occurring in the left frontotemporal region with some of the headaches causing intense ear pain.  He denies any associated light or sound sensitivity or nausea or vomiting.  He is also following with urology and was found to have low testosterone levels and will be starting her testosterone replacement soon.  He also reports losing 40 pounds in the last 2 months which was unintentional.  He has had EMG and muscle biopsy in the past which was essentially normal.    The  following portions of the patient's history were reviewed today and updated as of 11/19/2020  : allergies, social history and problem list.  This document will be scanned to patient's chart.      Current Outpatient Medications:   •  sildenafil (REVATIO) 20 MG tablet, TAKE 1 4 TABLETS BY MOUTH AS NEEDED FOR ERECTILE DYSFUNCTION, Disp: , Rfl:   •  topiramate (Topamax) 100 MG tablet, Take 1 tablet by mouth 2 (Two) Times a Day for 30 days., Disp: 60 tablet, Rfl: 2  •  topiramate (Topamax) 25 MG tablet, Take 1 tablet by mouth 2 (Two) Times a Day for 14 days., Disp: 28 tablet, Rfl: 0  •  topiramate (Topamax) 50 MG tablet, Take 1 tablet by mouth 2 (Two) Times a Day for 14 days., Disp: 28 tablet, Rfl: 0   Past Medical History:   Diagnosis Date   • Anxiety    • Back pain    • Depression    • Elevated blood pressure reading    • Elevated CK    • Epididymitis    • Hyporeflexia    • Rhabdomyolysis       Past Surgical History:   Procedure Laterality Date   • COLONOSCOPY     • WISDOM TOOTH EXTRACTION        Family History   Problem Relation Age of Onset   • Diabetes Mother    • Hypertension Mother    • Stroke Mother    • Depression Maternal Uncle    • Stroke Maternal Uncle    • Depression Maternal Grandmother    • Stroke Other    • Diabetes Other    • Cancer Other    • Diabetes Other    • Cancer Other    • Diabetes Other    • Cancer Other    • Heart failure Sister 41      Review of Systems   Constitutional: Positive for unexpected weight loss.   HENT: Positive for ear pain.    Eyes: Negative.    Respiratory: Negative.    Cardiovascular: Negative.    Gastrointestinal: Negative.    Endocrine: Negative.    Genitourinary: Negative.    Musculoskeletal: Positive for back pain and neck pain.   Skin: Negative.    Allergic/Immunologic: Negative.    Neurological: Positive for dizziness and headache.   Hematological: Negative.    Psychiatric/Behavioral: Negative.        All other systems reviewed and are negative     Objective:    /88  "  Pulse 59   Temp 97.5 °F (36.4 °C)   Ht 182.9 cm (72.01\")   Wt 135 kg (297 lb 3.2 oz)   SpO2 98%   BMI 40.30 kg/m²     Neurology Exam:    General apperance: NAD.     Mental status: Alert, awake and oriented to time place and person.    Recent and Remote memory: Can recall 3/3 objects at 5 minutes. Can recall historical events.     Attention span and Concentration: Serial 7s: Normal.     Fund of knowledge:  Normal.     Language and Speech: No aphasia or dysarthria.    Naming , Repitition and Comprehension:  Can name objects, repeat a sentence and follow commands. Speech is clear and fluent with good repetition, comprehension, and naming.    Cranial Nerves:   CN II: Visual fields are full. Intact. Fundi - Normal, No papillederma, Pupils - TAN  CN III, IV and VI: Extraocular movements are intact. Normal saccades.   CN V: Facial sensation is intact.   CN VII: Muscles of facial expression reveal no asymmetry. Intact.   CN VIII: Hearing is intact. Whispered voice intact.   CN IX and X: Palate elevates symmetrically. Intact  CN XI: Shoulder shrug is intact.   CN XII: Tongue is midline without evidence of atrophy or fasciculation.     Motor:  Right UE muscle strength 5/5. Normal tone.     Left UE muscle strength 5/5. Normal tone.      Right LE muscle strength5/5. Normal tone.     Left LE muscle strength 5/5. Normal tone.      Sensory: Normal light touch, vibration and pinprick sensation bilaterally.    DTRs: 2+ bilaterally in upper and lower extremities.    Babinski: Negative bilaterally.    Co-ordination: Normal finger-to-nose, heel to shin B/L.    Rhomberg: Negative.    Gait: Normal.    Cardiovascular: Regular rate and rhythm without murmur, gallop or rub.    Ophthalmoscopic exam: Normal fundi, no papilledema.    Assessment and Plan:  1. Tension headache  He is reporting almost daily headaches.  In the past, he has tried and failed gabapentin and amitriptyline.  He developed side effects of these 2 medications.  " I will start him on Topamax 25 mg nightly slowly increasing to 100 mg twice daily dose for headache prevention.  2. Elevated CPK  He is unsure as to when last CPK level was checked.  Overall myalgias, muscle cramps and feeling of fatigue and weakness have improved on its own.  I am going to check CPK levels and BMP today.  I have advised him to bring all the medical records from  for me to review on the next visit.  I would like to review the results of the genetic testing that was performed as well.  I will plan to see him back in 6 to 8 weeks for follow-up.  - CK; Future  - Lactate Dehydrogenase; Future  - Aldolase; Future  - Basic Metabolic Panel; Future       Return in about 8 weeks (around 1/14/2021).     Acosta Cao MD

## 2020-11-20 LAB — ALDOLASE SERPL-CCNC: 10.6 U/L (ref 3.3–10.3)

## 2020-12-03 ENCOUNTER — PATIENT MESSAGE (OUTPATIENT)
Dept: INTERNAL MEDICINE | Facility: CLINIC | Age: 30
End: 2020-12-03

## 2020-12-03 DIAGNOSIS — J02.9 SORE THROAT: ICD-10-CM

## 2020-12-03 DIAGNOSIS — R05.3 CHRONIC COUGH: Primary | ICD-10-CM

## 2020-12-03 DIAGNOSIS — K21.9 GASTROESOPHAGEAL REFLUX DISEASE WITHOUT ESOPHAGITIS: ICD-10-CM

## 2020-12-04 NOTE — TELEPHONE ENCOUNTER
From: Britton Vogt Jr.  To: Eliza Mayberry MD  Sent: 12/3/2020 4:32 PM EST  Subject: Visit Follow-Up Question    I would like to get screened for throat cancer. That's my big concern. It's not something that I'm freaking out about but it is a struggle for me every single day as far as feeling comfortable and normal and I want to make sure everything is okay. My question is how would I go about getting screened? Would I just ask my current neurologist or should I come in and see you?    Thank you!!

## 2021-01-13 ENCOUNTER — OFFICE VISIT (OUTPATIENT)
Dept: INTERNAL MEDICINE | Facility: CLINIC | Age: 31
End: 2021-01-13

## 2021-01-13 VITALS
DIASTOLIC BLOOD PRESSURE: 84 MMHG | SYSTOLIC BLOOD PRESSURE: 134 MMHG | WEIGHT: 277.12 LBS | OXYGEN SATURATION: 98 % | TEMPERATURE: 98.8 F | HEIGHT: 72 IN | HEART RATE: 71 BPM | BODY MASS INDEX: 37.53 KG/M2

## 2021-01-13 DIAGNOSIS — H65.92 FLUID LEVEL BEHIND TYMPANIC MEMBRANE OF LEFT EAR: ICD-10-CM

## 2021-01-13 DIAGNOSIS — S16.1XXA STRAIN OF NECK MUSCLE, INITIAL ENCOUNTER: Primary | ICD-10-CM

## 2021-01-13 PROCEDURE — 99213 OFFICE O/P EST LOW 20 MIN: CPT | Performed by: NURSE PRACTITIONER

## 2021-01-13 RX ORDER — FLUTICASONE PROPIONATE 50 MCG
2 SPRAY, SUSPENSION (ML) NASAL DAILY
Qty: 1 BOTTLE | Refills: 3 | Status: SHIPPED | OUTPATIENT
Start: 2021-01-13 | End: 2022-01-19

## 2021-01-13 RX ORDER — IBUPROFEN 600 MG/1
600 TABLET ORAL EVERY 6 HOURS PRN
Qty: 30 TABLET | Refills: 0 | Status: SHIPPED | OUTPATIENT
Start: 2021-01-13

## 2021-01-13 NOTE — PROGRESS NOTES
"  Office Visit      Patient Name: Britton Vogt Jr.  : 1990   MRN: [id number]   Care Team: Patient Care Team:  Eliza Mayberry MD as PCP - General  Eliza Mayberry MD as PCP - Family Medicine    Chief Complaint  Earache (x months, has been radiating down into the right side of neck with some swelling, also has been complaing of dizziness to the point that he is having to sit down. both pain and dizziness are interfering with daily activities.)    Subjective     Subjective      Britton Vogt Jr. presents to [facility] for left ear and neck pain. This problem has been going on for a few months. He denies any trauma, discharge from the ear, numbness/tingling in arms or legs, itching of the ear, burning in the throat, excess belching, and fever. Has not tried any OTC agents for the pain. Describes the pain as intermittently throbbing but it constantly is causing him discomfort.  Has had a few dizzy episodes this week.  Does not feel like the room is spinning.  Gets lightheaded randomly and quickly goes away.  No aggravating or alleviating factors. He is not a smoker and has never been.  Has been working out more to get in shape for potential new job as a .  Does not remember doing anything strenuous that exacerbated the symptoms.  Saw ENT a couple of weeks ago who looked at his ear and said he had an infection but he wasn't prescribed any antibiotics for this. ENT note reviewed and states they discussed reflux precautions and he could try pepcid for symptoms. He has not tried the pepcid.       Objective     Objective   Vital Signs:   /84   Pulse 71   Temp 98.8 °F (37.1 °C)   Ht 182.9 cm (72\")   Wt 126 kg (277 lb 1.9 oz)   SpO2 98%   BMI 37.58 kg/m²     Physical Exam  Vitals signs and nursing note reviewed.   Constitutional:       Appearance: Normal appearance.   HENT:      Right Ear: A middle ear effusion is present.      Left Ear: A middle " ear effusion is present.      Nose: No rhinorrhea.   Neck:      Musculoskeletal: Neck supple. No muscular tenderness.      Vascular: No carotid bruit.     Cardiovascular:      Rate and Rhythm: Normal rate and regular rhythm.      Heart sounds: Normal heart sounds. No murmur.   Pulmonary:      Effort: Pulmonary effort is normal.      Breath sounds: Normal breath sounds.   Abdominal:      General: There is no distension.      Palpations: Abdomen is soft.      Tenderness: There is no abdominal tenderness.   Lymphadenopathy:      Cervical: No cervical adenopathy.   Skin:     General: Skin is warm and dry.   Neurological:      Mental Status: He is alert.   Psychiatric:         Mood and Affect: Mood normal.          Assessment / Plan         Assessment  Problem List Items Addressed This Visit     None      Visit Diagnoses     Strain of neck muscle, initial encounter    -  Primary    Fluid level behind tympanic membrane of left ear                Plan  1.)  Strain of neck muscle  -Suspect his neck pain is related to a strain of the neck muscle.  Would like him to try ibuprofen for pain relief.  He will let us know if that is not helping and he may need to undergo further work-up by ENT.  Does not appear to be having any reflux symptoms therefore do not think that a GI consult is appropriate at this time.    2.)  Fluid level behind tympanic membrane of left ear  -Middle ear effusion noted on exam believe this could have contributed to his dizzy episode.  Flonase prescribed.  Educated on proper technique.  Does not appear to have a ear infection that warrants antibiotic therapy.  Encouraged to RTC if symptoms do not resolve.    Follow Up   Return if symptoms worsen or fail to improve.  Patient was given instructions and counseling regarding his condition or for health maintenance advice. Please see specific information pulled into the AVS if appropriate.     RENAY Jaffe  [facility] -  [place]

## 2021-02-02 ENCOUNTER — PATIENT MESSAGE (OUTPATIENT)
Dept: INTERNAL MEDICINE | Facility: CLINIC | Age: 31
End: 2021-02-02

## 2021-02-03 ENCOUNTER — TELEMEDICINE (OUTPATIENT)
Dept: NEUROLOGY | Facility: CLINIC | Age: 31
End: 2021-02-03

## 2021-02-03 DIAGNOSIS — G43.719 INTRACTABLE CHRONIC MIGRAINE WITHOUT AURA AND WITHOUT STATUS MIGRAINOSUS: ICD-10-CM

## 2021-02-03 DIAGNOSIS — R74.8 ELEVATED CPK: ICD-10-CM

## 2021-02-03 DIAGNOSIS — G89.29 NECK PAIN, CHRONIC: Primary | ICD-10-CM

## 2021-02-03 DIAGNOSIS — M54.2 NECK PAIN, CHRONIC: Primary | ICD-10-CM

## 2021-02-03 PROCEDURE — 99214 OFFICE O/P EST MOD 30 MIN: CPT | Performed by: PSYCHIATRY & NEUROLOGY

## 2021-02-03 RX ORDER — PREGABALIN 75 MG/1
75 CAPSULE ORAL 3 TIMES DAILY
Qty: 90 CAPSULE | Refills: 2 | Status: SHIPPED | OUTPATIENT
Start: 2021-02-03 | End: 2022-01-19

## 2021-02-03 NOTE — TELEPHONE ENCOUNTER
From: Eliza Mayberry MD  To: Britton Vogt Jr.  Sent: 2/2/2021 8:11 AM EST  Subject: checking in    I was discussing your case with Josefina this morning. Definitely keep this neurology appointment tomorrow, very important! We're brainstorming on next steps, just wanted you to know. I'd like to see what Dr. Cao suggests before we decide where to go from here.    Dr. Mayberry

## 2021-02-03 NOTE — PROGRESS NOTES
Subjective:    CC: Britton Vogt JrCici is in clinic today for follow up for      HPI:  Initial visit: 11/19/2020: Patient is a 30-year-old male with past medical history of elevated CPK, headaches referred to clinic to establish care.  He reports that he has seen [facility] Dr. Palomo for a long time.  He reports that the symptoms started about 5-6 years ago where he started noticing some myalgia, muscle cramps, overall feeling fatigued and weakness.  He underwent lab testing and was noted to have elevated CPK. He also noted exercise intolerance and headaches.  He reports that his serum CPK levels were as high as in 1600 range.  He has been told in the past that is common to have elevated CPK in the -American population and that he has idiopathic hyperCKemia.  Dr. Palomo was concerned because of elevated CPK and constellation of symptoms and involvement of greater than 2 organ system that me may have underlying mitochondrial disorder.  He reports that he underwent genetic testing and it was positive.  He is not sure of the exact result though.  Today in clinic he reports that he is more concerned about the headaches present as he reports that the myalgias, muscle cramps have resolved on its own and he overall feels better.  He was also on gabapentin and amitriptyline which he decided to stop because of side effects and since stopping, he feels much better.  However currently he reports almost daily headaches occurring in the left frontotemporal region with some of the headaches causing intense ear pain.  He denies any associated light or sound sensitivity or nausea or vomiting.  He is also following with urology and was found to have low testosterone levels and will be starting her testosterone replacement soon.  He also reports losing 40 pounds in the last 2 months which was unintentional.  He has had EMG and muscle biopsy in the past which was essentially normal.    Follow-up: 2/3/2021: This is a  follow-up done through video.  Since his last visit, he reports that he did try Topamax as per my instruction and titrated it up to 100 mg twice a day dose.  However it did not help so he stopped taking it.  He has also seen ENT for complaint of ear pain and ear pressure but no definitive cause was found.  So far, he has tried and failed amitriptyline, gabapentin, Topamax for headache prophylaxis.  After his last visit, CPK was checked and it was found to be elevated at 1338.  He however denies any  myalgia, muscle weakness.    The following portions of the patient's history were reviewed and updated as of 02/03/2021: allergies, social history and problem list.       Current Outpatient Medications:   •  fluticasone (Flonase) 50 MCG/ACT nasal spray, 2 sprays into the nostril(s) as directed by provider Daily., Disp: 1 bottle, Rfl: 3  •  ibuprofen (ADVIL,MOTRIN) 600 MG tablet, Take 1 tablet by mouth Every 6 (Six) Hours As Needed for Moderate Pain ., Disp: 30 tablet, Rfl: 0  •  pregabalin (Lyrica) 75 MG capsule, Take 1 capsule by mouth 3 (Three) Times a Day., Disp: 90 capsule, Rfl: 2  •  sildenafil (REVATIO) 20 MG tablet, TAKE 1 4 TABLETS BY MOUTH AS NEEDED FOR ERECTILE DYSFUNCTION, Disp: , Rfl:    Past Medical History:   Diagnosis Date   • Anxiety    • Back pain    • Depression    • Elevated blood pressure reading    • Elevated CK    • Epididymitis    • Hyporeflexia    • Rhabdomyolysis       Past Surgical History:   Procedure Laterality Date   • COLONOSCOPY     • WISDOM TOOTH EXTRACTION        Family History   Problem Relation Age of Onset   • Diabetes Mother    • Hypertension Mother    • Stroke Mother    • Depression Maternal Uncle    • Stroke Maternal Uncle    • Depression Maternal Grandmother    • Stroke Other    • Diabetes Other    • Cancer Other    • Diabetes Other    • Cancer Other    • Diabetes Other    • Cancer Other    • Heart failure Sister 41        Review of Systems  Objective:    There were no vitals taken  for this visit.    Neurology Exam:  General apperance: NAD.     Mental status: Alert, awake and oriented to time place and person.    Recent and Remote memory: Can recall 3/3 objects at 5 minutes. Can recall historical events.     Attention span and Concentration: Serial 7s: Normal.     Fund of knowledge:  Normal.     Language and Speech: No aphasia or dysarthria.    Naming , Repitition and Comprehension:  Can name objects, repeat a sentence and follow commands. Speech is clear and fluent with good repetition, comprehension, and naming.    CN II to XII: Intact.    Opthalmoscopic Exam: No papilledema.    Motor:  Right UE muscle strength 5/5. Normal tone.     Left UE muscle strength 5/5. Normal tone.      Right LE muscle strength5/5. Normal tone.     Left LE muscle strength 5/5. Normal tone.      Sensory: Normal light touch, vibration and pinprick sensation bilaterally.    DTRs: 2+ bilaterally.    Babinski: Negative bilaterally.    Co-ordination: Normal finger-to-nose, heel to shin B/L.    Rhomberg: Negative.    Gait: Normal.    Cardiovascular: Regular rate and rhythm without murmur, gallop or rub.    Assessment and Plan:  1. Neck pain, chronic  2. Intractable chronic migraine without aura and without status migrainosus  3. Elevated CPK  -He continues to have frequent migraines, ear pain and also reports neck pain.  He has now tried and failed amitriptyline, gabapentin and Topamax.  I am going to start him on Lyrica 75 mg nightly slowly increasing to 75 mg 3 times daily dose and see how he does.  If no response then my plan is to consider Botox for migraine prevention.  I will plan to do follow-up through be due in 8 weeks.    Unable to complete visit using a video connection to the patient. A phone visit was used to complete this visits. Total time of discussion was 30 minutes.      Return in about 8 weeks (around 3/31/2021).

## 2021-02-04 PROBLEM — R89.8 ABNORMAL GENETIC TEST: Chronic | Status: ACTIVE | Noted: 2021-02-04

## 2021-02-04 PROBLEM — R89.8 ABNORMAL GENETIC TEST: Status: ACTIVE | Noted: 2021-02-04

## 2021-02-04 PROBLEM — K62.89 ANAL PAIN: Status: RESOLVED | Noted: 2017-01-12 | Resolved: 2021-02-04

## 2021-02-10 RX ORDER — TOPIRAMATE 100 MG/1
100 TABLET, FILM COATED ORAL 2 TIMES DAILY
Qty: 180 TABLET | OUTPATIENT
Start: 2021-02-10 | End: 2021-03-12

## 2021-02-18 ENCOUNTER — TELEPHONE (OUTPATIENT)
Dept: INTERNAL MEDICINE | Facility: CLINIC | Age: 31
End: 2021-02-18

## 2021-02-18 NOTE — TELEPHONE ENCOUNTER
PATIENT CALLED STATING HE RECEIVED A [employer] MESSAGE STATING THAT HE DENIED TREATMENT     PT IS UNSURE WHAT THIS IS REGARDING    PLEASE ADVISE AT: [phone]

## 2021-02-18 NOTE — TELEPHONE ENCOUNTER
Spoke with pt and he was confused about a med refill denial for Topiramate. Advised that was done through Dr. Cao's office and he needs to contact him for more information. Pt states he is just confused because he just picked this med up. Again advised to contact Dr. Cao's office for more info.

## 2021-10-27 ENCOUNTER — OFFICE VISIT (OUTPATIENT)
Dept: INTERNAL MEDICINE | Facility: CLINIC | Age: 31
End: 2021-10-27

## 2021-10-27 VITALS
WEIGHT: 302.5 LBS | BODY MASS INDEX: 40.97 KG/M2 | TEMPERATURE: 97 F | HEIGHT: 72 IN | OXYGEN SATURATION: 98 % | DIASTOLIC BLOOD PRESSURE: 75 MMHG | HEART RATE: 98 BPM | SYSTOLIC BLOOD PRESSURE: 135 MMHG

## 2021-10-27 DIAGNOSIS — F90.0 ATTENTION DEFICIT HYPERACTIVITY DISORDER (ADHD), PREDOMINANTLY INATTENTIVE TYPE: Primary | ICD-10-CM

## 2021-10-27 PROCEDURE — 99214 OFFICE O/P EST MOD 30 MIN: CPT | Performed by: FAMILY MEDICINE

## 2021-10-27 RX ORDER — TESTOSTERONE CYPIONATE 200 MG/ML
INJECTION, SOLUTION INTRAMUSCULAR
COMMUNITY
Start: 2021-10-05

## 2021-10-27 NOTE — PROGRESS NOTES
"Subjective    Britton Vogt Jr. is a 31 y.o. male here for:  Chief Complaint   Patient presents with   • ADHD     Seen about 10 years ago by psych for ADHD. Good response to Vyvanse in college. Now he is in the [employer] and needs help with focus.        History per MA reviewed.    Has trouble staying focused even with conversations, when somebody's talking he'll start thinking of other things. Always moving, doesn't realize it. Always twiddling thumbs. Has been doing that since childhood.    Mom kept him back in 2nd grade. Has had issues with attention, concentration life long.    He's provided a copy of his psychological testing as well. Testing was performed by Melly Braber, PhD, when Mr. Vogt was a freshman at . He was on the football team.    He took Vyvanse in college and it helped w/o side effects.         The following portions of the patient's history were reviewed and updated as appropriate: allergies, current medications, past family history, past medical history, past social history, past surgical history and problem list.    Review of Systems   Constitutional: Negative for fever.   Musculoskeletal: Positive for back pain.   Psychiatric/Behavioral: Positive for decreased concentration.         Objective   Visit Vitals  /75   Pulse 98   Temp 97 °F (36.1 °C) (Temporal)   Ht 182.9 cm (72\")   Wt (!) 137 kg (302 lb 8 oz)   SpO2 98%   BMI 41.03 kg/m²       Physical Exam  Vitals and nursing note reviewed.   Constitutional:       General: He is not in acute distress.     Appearance: Normal appearance. He is well-developed and well-groomed. He is not ill-appearing, toxic-appearing or diaphoretic.      Interventions: Face mask in place.   HENT:      Head: Normocephalic and atraumatic.      Right Ear: Hearing normal.      Left Ear: Hearing normal.   Eyes:      General: Lids are normal. No scleral icterus.     Extraocular Movements: Extraocular movements intact.   Cardiovascular:      Rate and " Rhythm: Normal rate and regular rhythm.   Pulmonary:      Effort: Pulmonary effort is normal.   Musculoskeletal:      Cervical back: Neck supple.   Skin:     Coloration: Skin is not jaundiced or pale.   Neurological:      General: No focal deficit present.      Mental Status: He is alert and oriented to person, place, and time.   Psychiatric:         Attention and Perception: Attention and perception normal.         Mood and Affect: Mood and affect normal.         Speech: Speech normal.         Behavior: Behavior normal. Behavior is cooperative.         Thought Content: Thought content normal.         Cognition and Memory: Cognition and memory normal.         Judgment: Judgment normal.       Reviewed past labs which included elevated CK and Aldolase levels. S/p extensive work up and patient has not had any issues with muscle weakness/pain for some time now. Last two neurology notes, Dr. Cao, reviewed as well.    See scanned media for psychological testing. Dates of testin11, 11, 10/3/11, and 10/10/11. Axis I: Learning disability in math, ADHD predominantly inattentive type, mild depression. Axis II: none.      Assessment/Plan     Problem List Items Addressed This Visit        Mental Health    Attention deficit hyperactivity disorder (ADHD), predominantly inattentive type - Primary    Overview     See scanned media for psychological testing performed by Melly Barber, PhD. Dates of testin11, 11, 10/3/11, and 10/10/11.         Relevant Medications    lisdexamfetamine (Vyvanse) 30 MG capsule    Other Relevant Orders    Compliance Drug Analysis, Ur - Urine, Clean Catch          · No red flags, patient well known to me, has provided his testing documentation from college. Resuming Vyvanse should help with his focus in his current training environment and work thereafter. Drug contract provided for review, will have him sign next visit if he wishes to continue therapy. Can titrate up Vyvanse  over time if needed. Will need visits every 3 months, though video visits are an option. ABDIFATAH reviewed and appropriate.      Return in about 4 weeks (around 11/24/2021) for Controlled Rx Follow Up.     Eliza Mayberry MD

## 2021-10-27 NOTE — PATIENT INSTRUCTIONS
Living With Attention Deficit Hyperactivity Disorder  If you have been diagnosed with attention deficit hyperactivity disorder (ADHD), you may be relieved that you now know why you have felt or behaved a certain way. Still, you may feel overwhelmed about the treatment ahead. You may also wonder how to get the support you need and how to deal with the condition day-to-day. With treatment and support, you can live with ADHD and manage your symptoms.  How to manage lifestyle changes  Managing stress  Stress is your body's reaction to life changes and events, both good and bad. To cope with the stress of an ADHD diagnosis, it may help to:  · Learn more about ADHD.  · Exercise regularly. Even a short daily walk can lower stress levels.  · Participate in training or education programs (including social skills training classes) that teach you to deal with symptoms.    Medicines  Your health care provider may suggest certain medicines if he or she feels that they will help to improve your condition. Stimulant medicines are usually prescribed to treat ADHD, and therapy may also be prescribed. It is important to:  · Avoid using alcohol and other substances that may prevent your medicines from working properly (may interact).  · Talk with your pharmacist or health care provider about all the medicines that you take, their possible side effects, and what medicines are safe to take together.  · Make it your goal to take part in all treatment decisions (shared decision-making). Ask about possible side effects of medicines that your health care provider recommends, and tell him or her how you feel about having those side effects. It is best if shared decision-making with your health care provider is part of your total treatment plan.  Relationships  To strengthen your relationships with family members while treating your condition, consider taking part in family therapy. You might also attend self-help groups alone or with a loved  one.  Be honest about how your symptoms affect your relationships. Make an effort to communicate respectfully instead of fighting, and find ways to show others that you care. Psychotherapy may be useful in helping you cope with how ADHD affects your relationships.  How to recognize changes in your condition  The following signs may mean that your treatment is working well and your condition is improving:  · Consistently being on time for appointments.  · Being more organized at home and work.  · Other people noticing improvements in your behavior.  · Achieving goals that you set for yourself.  · Thinking more clearly.  The following signs may mean that your treatment is not working very well:  · Feeling impatience or more confusion.  · Missing, forgetting, or being late for appointments.  · An increasing sense of disorganization and messiness.  · More difficulty in reaching goals that you set for yourself.  · Loved ones becoming angry or frustrated with you.  Follow these instructions at home:  · Take over-the-counter and prescription medicines only as told by your health care provider. Check with your health care provider before taking any new medicines.  · Create structure and an organized atmosphere at home. For example:  ? Make a list of tasks, then rank them from most important to least important. Work on one task at a time until your listed tasks are done.  ? Make a daily schedule and follow it consistently every day.  ? Use an appointment calendar, and check it 2 or 3 times a day to keep on track. Keep it with you when you leave the house.  ? Create spaces where you keep certain things, and always put things back in their places after you use them.  · Keep all follow-up visits as told by your health care provider. This is important.  Where to find support  Talking to others    · Keep emotion out of important discussions and speak in a calm, logical way.  · Listen closely and patiently to your loved ones. Try  to understand their point of view, and try to avoid getting defensive.  · Take responsibility for the consequences of your actions.  · Ask that others do not take your behaviors personally.  · Aim to solve problems as they come up, and express your feelings instead of bottling them up.  · Talk openly about what you need from your loved ones and how they can support you.  · Consider going to family therapy sessions or having your family meet with a specialist who deals with ADHD-related behavior problems.    Finances  Not all insurance plans cover mental health care, so it is important to check with your insurance carrier. If paying for co-pays or counseling services is a problem, search for a [facility] or [facility] mental health care center. Public mental health care services may be offered there at a low cost or no cost when you are not able to see a private health care provider.  If you are taking medicine for ADHD, you may be able to get the generic form, which may be less expensive than brand-name medicine. Some makers of prescription medicines also offer help to patients who cannot afford the medicines that they need.  Questions to ask your health care provider:  · What are the risks and benefits of taking medicines?  · Would I benefit from therapy?  · How often should I follow up with a health care provider?  Contact a health care provider if:  · You have side effects from your medicines, such as:  ? Repeated muscle twitches, coughing, or speech outbursts.  ? Sleep problems.  ? Loss of appetite.  ? Depression.  ? New or worsening behavior problems.  ? Dizziness.  ? Unusually fast heartbeat.  ? Stomach pains.  ? Headaches.  Get help right away if:  · You have a severe reaction to a medicine.  · Your behavior suddenly gets worse.  Summary  · With treatment and support, you can live with ADHD and manage your symptoms.  · The medicines that are most often prescribed for ADHD are stimulants.  · Consider taking part in  family therapy or self-help groups with family members or friends.  · When you talk with friends and family about your ADHD, be patient and communicate openly.  · Take over-the-counter and prescription medicines only as told by your health care provider. Check with your health care provider before taking any new medicines.  This information is not intended to replace advice given to you by your health care provider. Make sure you discuss any questions you have with your health care provider.  Document Revised: 06/02/2021 Document Reviewed: 06/02/2021  Elsevier Patient Education © 2021 [employer]

## 2021-11-01 LAB — DRUGS UR: NORMAL

## 2022-01-19 ENCOUNTER — OFFICE VISIT (OUTPATIENT)
Dept: INTERNAL MEDICINE | Facility: CLINIC | Age: 32
End: 2022-01-19

## 2022-01-19 VITALS
DIASTOLIC BLOOD PRESSURE: 82 MMHG | BODY MASS INDEX: 41.58 KG/M2 | WEIGHT: 307 LBS | HEART RATE: 61 BPM | TEMPERATURE: 97.1 F | HEIGHT: 72 IN | RESPIRATION RATE: 16 BRPM | SYSTOLIC BLOOD PRESSURE: 132 MMHG | OXYGEN SATURATION: 95 %

## 2022-01-19 DIAGNOSIS — Z28.21 COVID-19 VACCINATION DECLINED: ICD-10-CM

## 2022-01-19 DIAGNOSIS — F90.0 ATTENTION DEFICIT HYPERACTIVITY DISORDER (ADHD), PREDOMINANTLY INATTENTIVE TYPE: Primary | ICD-10-CM

## 2022-01-19 DIAGNOSIS — Z51.81 MEDICATION MONITORING ENCOUNTER: ICD-10-CM

## 2022-01-19 DIAGNOSIS — Z28.21 INFLUENZA VACCINATION DECLINED: ICD-10-CM

## 2022-01-19 PROCEDURE — 99213 OFFICE O/P EST LOW 20 MIN: CPT | Performed by: FAMILY MEDICINE

## 2022-01-19 NOTE — PATIENT INSTRUCTIONS
Living With Attention Deficit Hyperactivity Disorder  If you have been diagnosed with attention deficit hyperactivity disorder (ADHD), you may be relieved that you now know why you have felt or behaved a certain way. Still, you may feel overwhelmed about the treatment ahead. You may also wonder how to get the support you need and how to deal with the condition day-to-day. With treatment and support, you can live with ADHD and manage your symptoms.  How to manage lifestyle changes  Managing stress  Stress is your body's reaction to life changes and events, both good and bad. To cope with the stress of an ADHD diagnosis, it may help to:  · Learn more about ADHD.  · Exercise regularly. Even a short daily walk can lower stress levels.  · Participate in training or education programs (including social skills training classes) that teach you to deal with symptoms.    Medicines  Your health care provider may suggest certain medicines if he or she feels that they will help to improve your condition. Stimulant medicines are usually prescribed to treat ADHD, and therapy may also be prescribed. It is important to:  · Avoid using alcohol and other substances that may prevent your medicines from working properly (may interact).  · Talk with your pharmacist or health care provider about all the medicines that you take, their possible side effects, and what medicines are safe to take together.  · Make it your goal to take part in all treatment decisions (shared decision-making). Ask about possible side effects of medicines that your health care provider recommends, and tell him or her how you feel about having those side effects. It is best if shared decision-making with your health care provider is part of your total treatment plan.  Relationships  To strengthen your relationships with family members while treating your condition, consider taking part in family therapy. You might also attend self-help groups alone or with a loved  one.  Be honest about how your symptoms affect your relationships. Make an effort to communicate respectfully instead of fighting, and find ways to show others that you care. Psychotherapy may be useful in helping you cope with how ADHD affects your relationships.  How to recognize changes in your condition  The following signs may mean that your treatment is working well and your condition is improving:  · Consistently being on time for appointments.  · Being more organized at home and work.  · Other people noticing improvements in your behavior.  · Achieving goals that you set for yourself.  · Thinking more clearly.  The following signs may mean that your treatment is not working very well:  · Feeling impatience or more confusion.  · Missing, forgetting, or being late for appointments.  · An increasing sense of disorganization and messiness.  · More difficulty in reaching goals that you set for yourself.  · Loved ones becoming angry or frustrated with you.  Follow these instructions at home:  · Take over-the-counter and prescription medicines only as told by your health care provider. Check with your health care provider before taking any new medicines.  · Create structure and an organized atmosphere at home. For example:  ? Make a list of tasks, then rank them from most important to least important. Work on one task at a time until your listed tasks are done.  ? Make a daily schedule and follow it consistently every day.  ? Use an appointment calendar, and check it 2 or 3 times a day to keep on track. Keep it with you when you leave the house.  ? Create spaces where you keep certain things, and always put things back in their places after you use them.  · Keep all follow-up visits as told by your health care provider. This is important.  Where to find support  Talking to others    · Keep emotion out of important discussions and speak in a calm, logical way.  · Listen closely and patiently to your loved ones. Try  to understand their point of view, and try to avoid getting defensive.  · Take responsibility for the consequences of your actions.  · Ask that others do not take your behaviors personally.  · Aim to solve problems as they come up, and express your feelings instead of bottling them up.  · Talk openly about what you need from your loved ones and how they can support you.  · Consider going to family therapy sessions or having your family meet with a specialist who deals with ADHD-related behavior problems.    Finances  Not all insurance plans cover mental health care, so it is important to check with your insurance carrier. If paying for co-pays or counseling services is a problem, search for a [facility] or [facility] mental health care center. Public mental health care services may be offered there at a low cost or no cost when you are not able to see a private health care provider.  If you are taking medicine for ADHD, you may be able to get the generic form, which may be less expensive than brand-name medicine. Some makers of prescription medicines also offer help to patients who cannot afford the medicines that they need.  Questions to ask your health care provider:  · What are the risks and benefits of taking medicines?  · Would I benefit from therapy?  · How often should I follow up with a health care provider?  Contact a health care provider if:  · You have side effects from your medicines, such as:  ? Repeated muscle twitches, coughing, or speech outbursts.  ? Sleep problems.  ? Loss of appetite.  ? Depression.  ? New or worsening behavior problems.  ? Dizziness.  ? Unusually fast heartbeat.  ? Stomach pains.  ? Headaches.  Get help right away if:  · You have a severe reaction to a medicine.  · Your behavior suddenly gets worse.  Summary  · With treatment and support, you can live with ADHD and manage your symptoms.  · The medicines that are most often prescribed for ADHD are stimulants.  · Consider taking part in  family therapy or self-help groups with family members or friends.  · When you talk with friends and family about your ADHD, be patient and communicate openly.  · Take over-the-counter and prescription medicines only as told by your health care provider. Check with your health care provider before taking any new medicines.  This information is not intended to replace advice given to you by your health care provider. Make sure you discuss any questions you have with your health care provider.  Document Revised: 06/02/2021 Document Reviewed: 06/02/2021  Elsevier Patient Education © 2021 [employer]

## 2022-01-20 ENCOUNTER — TELEPHONE (OUTPATIENT)
Dept: INTERNAL MEDICINE | Facility: CLINIC | Age: 32
End: 2022-01-20

## 2022-01-20 NOTE — TELEPHONE ENCOUNTER
Caller: Britton Vogt Jr.    Relationship to patient: Self    Best call back number: [phone]    Patient is needing: PATIENT GAVE INFO FOR INSURANCE FOR MEDICATIONS     [employer]   #[id number]  RX#BIN [id number]    STATED THAT HE HAS THIS INFO FOR OFFICE  PLEASE ADVISE

## 2022-01-21 LAB
AMPHETAMINES UR QL SCN: NEGATIVE NG/ML
BARBITURATES UR QL SCN: NEGATIVE NG/ML
BENZODIAZ UR QL SCN: NEGATIVE NG/ML
BZE UR QL SCN: NEGATIVE NG/ML
CANNABINOIDS UR QL SCN: NEGATIVE NG/ML
CREAT UR-MCNC: 143.7 MG/DL (ref 20–300)
LABORATORY COMMENT REPORT: NORMAL
METHADONE UR QL SCN: NEGATIVE NG/ML
OPIATES UR QL SCN: NEGATIVE NG/ML
OXYCODONE+OXYMORPHONE UR QL SCN: NEGATIVE NG/ML
PCP UR QL: NEGATIVE NG/ML
PH UR: 5.2 [PH] (ref 4.5–8.9)
PROPOXYPH UR QL SCN: NEGATIVE NG/ML

## 2022-02-09 ENCOUNTER — TELEMEDICINE (OUTPATIENT)
Dept: INTERNAL MEDICINE | Facility: CLINIC | Age: 32
End: 2022-02-09

## 2022-02-09 ENCOUNTER — PATIENT MESSAGE (OUTPATIENT)
Dept: INTERNAL MEDICINE | Facility: CLINIC | Age: 32
End: 2022-02-09

## 2022-02-09 DIAGNOSIS — F90.0 ATTENTION DEFICIT HYPERACTIVITY DISORDER (ADHD), PREDOMINANTLY INATTENTIVE TYPE: Primary | ICD-10-CM

## 2022-02-09 DIAGNOSIS — F90.0 ATTENTION DEFICIT HYPERACTIVITY DISORDER (ADHD), PREDOMINANTLY INATTENTIVE TYPE: ICD-10-CM

## 2022-02-09 PROBLEM — U07.1 COVID-19: Status: RESOLVED | Noted: 2021-07-14 | Resolved: 2022-02-09

## 2022-02-09 PROBLEM — U07.1 COVID-19: Status: ACTIVE | Noted: 2021-07-14

## 2022-02-09 RX ORDER — PREGABALIN 75 MG/1
CAPSULE ORAL
COMMUNITY
End: 2022-04-06

## 2022-02-09 RX ORDER — SERTRALINE HYDROCHLORIDE 25 MG/1
TABLET, FILM COATED ORAL
COMMUNITY
End: 2022-04-06

## 2022-02-09 RX ORDER — MIRTAZAPINE 30 MG/1
TABLET, FILM COATED ORAL
COMMUNITY
End: 2022-04-06

## 2022-02-09 RX ORDER — HYDROXYZINE PAMOATE 25 MG/1
CAPSULE ORAL EVERY 6 HOURS SCHEDULED
COMMUNITY
End: 2022-04-06

## 2022-02-09 RX ORDER — METHOCARBAMOL 500 MG/1
TABLET, FILM COATED ORAL
COMMUNITY
End: 2022-04-06

## 2022-02-09 RX ORDER — ALBUTEROL SULFATE 90 UG/1
AEROSOL, METERED RESPIRATORY (INHALATION)
COMMUNITY
End: 2022-04-06

## 2022-02-09 RX ORDER — MELOXICAM 15 MG/1
TABLET ORAL
COMMUNITY
End: 2022-04-06

## 2022-02-09 NOTE — PROGRESS NOTES
Subjective    Britton Vogt Jr. is a 31 y.o. male here for:  No chief complaint on file.     This encounter was created in error - please disregard. Pt unable to connect    Britton Vogt Jr. presents during the COVID-19 pandemic/federally declared [facility] of public health emergency. This service was conducted via [email or web address].   Britton Vogt Jr. is avoiding exposure to infectious illness (he has a clinical indication necessitating the telehealth service)    History of Present Illness       During today's visit, I reviewed the documented allergies, medications, chief complaint, and pertinent vitals.  I have confirmed with the patient that there have been no changes since this information was discussed with my clinical team member.    The following portions of the patient's history were reviewed and updated as appropriate: allergies, current medications, past family history, past medical history, past social history, past surgical history and problem list.    Review of Systems    There were no vitals taken for this visit.      Objective   Nursing note reviewed.  General: healthy appearing, no distress.  HENT: no facial deformities, hearing is within normal limits   Eyes: EOM intact, no obvious nystagmus.  Neck: phonation normal. No stridor  Pulm: Normal work of breathing  Neuro: A&O x 3. No abnormal movements.  Skin: No rashes appreciated to face. No visible cyanosis  Psych: Mood and affect appropriate. Insight normal. Judgement appropriate. Behavior normal. Memory normal.     Urine Drug Screen - Urine, Clean Catch (2022 16:58)    Assessment/Plan     Problem List Items Addressed This Visit        Mental Health    Attention deficit hyperactivity disorder (ADHD), predominantly inattentive type - Primary    Overview     See scanned media for psychological testing performed by Melly Barber, PhD. Dates of testin11, 11, 10/3/11, and 10/10/11.         Relevant Medications    mirtazapine (Remeron) 30  MG tablet    sertraline (ZOLOFT) 25 MG tablet    hydrOXYzine pamoate (Vistaril) 25 MG capsule          · ABDIFATAH reviewed and appropriate.  UDS up to date.    Eliza Mayberry MD This encounter was created in error - please disregard. Pt unable to connect

## 2022-02-09 NOTE — TELEPHONE ENCOUNTER
From: Britton Vogt Jr.  To: Eliza Mayberry MD  Sent: 2/9/2022 4:23 PM EST  Subject: ZOOM    Hey, im trying to launch the zoom meeting but it's not allowing me to.

## 2022-02-10 NOTE — TELEPHONE ENCOUNTER
This video visit did not happen-had Zoom/launch issues.  Need to make sure he not billed for visit??

## 2022-03-05 NOTE — ED PROVIDER NOTES
Subjective   Britton Rivera Jr. is a pleasant, unfortunate 29 y.o. male who presents to the ED with c/o cough. The patient reports that he has had a worsening cough over the past 2.5-3 months that alternates between a dry cough and a clear, tan productive one. He states that his cough is constant throughout the day and he cannot recall any triggering mechanism to cause the onset of this symptom. The patient has been seen at  for his cough and myalgia and at [facility] he was diagnosed with bronchitis with a prescription for Azithromycin which has been ineffective. He complains of shortness of breath, chest pressure, chills, and diaphoresis but denies leg swelling or ankle swelling. 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 He has participated in a sleep apnea study recently and states that his machine at home has helped his sleep significantly. The patient has never used an inhaler or nebulizer. He had an ECHO performed a few years ago with normal results and he reports a history of cardiac stress test but did not provide any results. The patient has a family history of cardiac disease. He has a past medical history of anxiety, elevated CK, depression, rhabdomyolysis, hyporeflexia, and epididymitis. His surgical history includes an EGD last year. There are no other acute complaints at this time.      History provided by:  Patient  Cough   Cough characteristics:  Productive and dry  Sputum characteristics:  Clear (tan)  Severity:  Severe  Onset quality:  Gradual  Duration:  12 weeks  Timing:  Constant  Progression:  Worsening  Chronicity:  Chronic  Smoker: no    Context: upper respiratory infection    Context: not occupational exposure and not sick contacts    Relieved by:  Nothing  Worsened by:  Activity  Ineffective treatments:  Rest  Associated  symptoms: chest pain, chills, diaphoresis and shortness of breath    Associated symptoms: no fever, no rhinorrhea and no sore throat    Risk factors: recent infection    Risk factors: no chemical exposure        Review of Systems   Constitutional: Positive for chills, diaphoresis and fatigue. Negative for fever.   HENT: Negative for rhinorrhea and sore throat.    Respiratory: Positive for cough and shortness of breath.    Cardiovascular: Positive for chest pain. Negative for leg swelling.        He denies ankle swelling.   Neurological: Positive for weakness. Negative for dizziness, syncope and light-headedness.   All other systems reviewed and are negative.      Past Medical History:   Diagnosis Date   • Anxiety    • Back pain    • Depression    • Elevated blood pressure reading    • Elevated CK    • Epididymitis    • Hyporeflexia    • Rhabdomyolysis      · Left ventricular systolic function is normal. Estimated EF = 60%.  · Left ventricular diastolic function is normal       No Known Allergies    Past Surgical History:   Procedure Laterality Date   • WISDOM TOOTH EXTRACTION         Family History   Problem Relation Age of Onset   • Diabetes Mother    • Hypertension Mother    • Stroke Mother    • Depression Maternal Uncle    • Stroke Maternal Uncle    • Depression Maternal Grandmother    • Stroke Other    • Diabetes Other    • Cancer Other    • Diabetes Other    • Cancer Other    • Diabetes Other    • Cancer Other    • Heart failure Sister 41       Social History     Socioeconomic History   • Marital status: Single     Spouse name: Not on file   • Number of children: Not on file   • Years of education: Not on file   • Highest education level: Not on file   Tobacco Use   • Smoking status: Never Smoker   • Smokeless tobacco: Never Used   Substance and Sexual Activity   • Alcohol use: No     Frequency: Never   • Drug use: No   • Sexual activity: Defer           Objective   Physical Exam   Constitutional: He is  oriented to person, place, and time. He appears well-developed and well-nourished. No distress.   HENT:   Head: Normocephalic and atraumatic.   Mouth/Throat: Posterior oropharyngeal erythema present. No oropharyngeal exudate.   Uvula midline, normal nasal pharynx.  Posterior oropharynx is slightly irritated and erythematous with no exudates.   Eyes: Conjunctivae are normal. No scleral icterus.   Neck: Normal range of motion. Neck supple.   No adenopathy, JVD, bruits or thyromegaly.   Cardiovascular: Normal rate, regular rhythm and normal heart sounds.   No murmur heard.  Pulmonary/Chest: Effort normal and breath sounds normal. No respiratory distress.   Occasional dry cough upon examination.   Abdominal: Soft. There is no tenderness.   Positive bowel sounds, soft, non tender. No organomegaly or hepatosplenomegaly. Flanks are without masses or rashes.  Mildly obese.   Musculoskeletal: Normal range of motion. He exhibits no edema.   No rash, synovitis or edema. Axilla is without rashes or masses.   Neurological: He is alert and oriented to person, place, and time.   Face symmetric, voices strong, tongue midline.  Vision, hearing and speech all preserved.  Motor strength, DTRs are normal.  Negative for sensory deficit.   Skin: Skin is warm and dry.   Psychiatric: He has a normal mood and affect. His behavior is normal.   Nursing note and vitals reviewed.      Procedures           ED Course  ED Course as of Feb 11 0831   Tue Feb 11, 2020 0823 Dr. Elias is bedside re-evaluating the patient and updating him on the results of the studies.    [BS]      ED Course User Index  [BS] Rony Agarwal                                     Recent Results (from the past 24 hour(s))   Comprehensive Metabolic Panel    Collection Time: 02/11/20  6:50 AM   Result Value Ref Range    Glucose 142 (H) 65 - 99 mg/dL    BUN 17 6 - 20 mg/dL    Creatinine 0.96 0.76 - 1.27 mg/dL    Sodium 138 136 - 145 mmol/L    Potassium 3.9 3.5 - 5.2 mmol/L     Chloride 102 98 - 107 mmol/L    CO2 26.0 22.0 - 29.0 mmol/L    Calcium 8.8 8.6 - 10.5 mg/dL    Total Protein 7.1 6.0 - 8.5 g/dL    Albumin 4.20 3.50 - 5.20 g/dL    ALT (SGPT) 37 1 - 41 U/L    AST (SGOT) 35 1 - 40 U/L    Alkaline Phosphatase 72 39 - 117 U/L    Total Bilirubin 0.2 0.2 - 1.2 mg/dL    eGFR  African Amer 114 >60 mL/min/1.73    Globulin 2.9 gm/dL    A/G Ratio 1.4 g/dL    BUN/Creatinine Ratio 17.7 7.0 - 25.0    Anion Gap 10.0 5.0 - 15.0 mmol/L   Troponin    Collection Time: 02/11/20  6:50 AM   Result Value Ref Range    Troponin T <0.010 0.000 - 0.030 ng/mL   BNP    Collection Time: 02/11/20  6:50 AM   Result Value Ref Range    proBNP <5.0 (L) 5.0 - 450.0 pg/mL   CBC Auto Differential    Collection Time: 02/11/20  6:50 AM   Result Value Ref Range    WBC 5.83 3.40 - 10.80 10*3/mm3    RBC 4.74 4.14 - 5.80 10*6/mm3    Hemoglobin 14.2 13.0 - 17.7 g/dL    Hematocrit 42.0 37.5 - 51.0 %    MCV 88.6 79.0 - 97.0 fL    MCH 30.0 26.6 - 33.0 pg    MCHC 33.8 31.5 - 35.7 g/dL    RDW 12.7 12.3 - 15.4 %    RDW-SD 41.4 37.0 - 54.0 fl    MPV 10.8 6.0 - 12.0 fL    Platelets 206 140 - 450 10*3/mm3    Neutrophil % 46.1 42.7 - 76.0 %    Lymphocyte % 42.2 19.6 - 45.3 %    Monocyte % 9.8 5.0 - 12.0 %    Eosinophil % 1.2 0.3 - 6.2 %    Basophil % 0.5 0.0 - 1.5 %    Immature Grans % 0.2 0.0 - 0.5 %    Neutrophils, Absolute 2.69 1.70 - 7.00 10*3/mm3    Lymphocytes, Absolute 2.46 0.70 - 3.10 10*3/mm3    Monocytes, Absolute 0.57 0.10 - 0.90 10*3/mm3    Eosinophils, Absolute 0.07 0.00 - 0.40 10*3/mm3    Basophils, Absolute 0.03 0.00 - 0.20 10*3/mm3    Immature Grans, Absolute 0.01 0.00 - 0.05 10*3/mm3    nRBC 0.0 0.0 - 0.2 /100 WBC   Gold Top - SST    Collection Time: 02/11/20  6:50 AM   Result Value Ref Range    Extra Tube Hold for add-ons.    Influenza Antigen, Rapid - Swab, Nasopharynx    Collection Time: 02/11/20  6:52 AM   Result Value Ref Range    Influenza A Ag, EIA Negative Negative    Influenza B Ag, EIA Negative Negative    Urinalysis With Microscopic If Indicated (No Culture) - Urine, Clean Catch    Collection Time: 02/11/20  7:33 AM   Result Value Ref Range    Color, UA Yellow Yellow, Straw    Appearance, UA Cloudy (A) Clear    pH, UA <=5.0 5.0 - 8.0    Specific Gravity, UA 1.029 1.001 - 1.030    Glucose, UA Negative Negative    Ketones, UA Negative Negative    Bilirubin, UA Negative Negative    Blood, UA Negative Negative    Protein, UA Negative Negative    Leuk Esterase, UA Negative Negative    Nitrite, UA Negative Negative    Urobilinogen, UA 1.0 E.U./dL 0.2 - 1.0 E.U./dL   Urinalysis, Microscopic Only - Urine, Clean Catch    Collection Time: 02/11/20  7:33 AM   Result Value Ref Range    RBC, UA 0-2 None Seen, 0-2 /HPF    WBC, UA 0-2 None Seen, 0-2 /HPF    Bacteria, UA None Seen None Seen, Trace /HPF    Squamous Epithelial Cells, UA None Seen None Seen, 0-2 /HPF    Hyaline Casts, UA None Seen 0 - 6 /LPF    Methodology Automated Microscopy      Note: In addition to lab results from this visit, the labs listed above may include labs taken at another facility or during a different encounter within the last 24 hours. Please correlate lab times with ED admission and discharge times for further clarification of the services performed during this visit.    XR Chest 1 View   Final Result   No acute cardiopulmonary findings.      Signer Name: Ly Farias MD    Signed: 2/11/2020 7:14 AM    Workstation Name: [email or web address]     [facility]        Vitals:    02/11/20 0640 02/11/20 0645 02/11/20 0700 02/11/20 0733   BP:   127/70 120/90   Patient Position:    Lying   Pulse: 84 79 84 76   Resp:    8   Temp:       TempSrc:       SpO2: 94% 95% 93% 94%   Weight:       Height:         Medications   albuterol sulfate HFA (PROVENTIL HFA;VENTOLIN HFA;PROAIR HFA) inhaler 2 puff (has no administration in time range)     ECG/EMG Results (last 24 hours)     Procedure Component Value Units Date/Time    ECG 12 Lead [[id number]] Collected:   02/11/20 0627     Updated:  02/11/20 0651    Narrative:       Test Reason : chest pain  Blood Pressure : **/** mmHG  Vent. Rate : 066 BPM     Atrial Rate : 066 BPM     P-R Int : 134 ms          QRS Dur : 082 ms      QT Int : 382 ms       P-R-T Axes : 027 007 018 degrees     QTc Int : 400 ms    Normal sinus rhythm with sinus arrhythmia  When compared with ECG of 07-DEC-2018 01:44,  No significant change was found  Confirmed by YESI DUMONT MD ([id number]) on 2/11/2020 6:51:03 AM    Referred By:  YOCASTA           Confirmed By:YESI DUMONT MD        ECG 12 Lead   Final Result   Test Reason : chest pain   Blood Pressure : **/** mmHG   Vent. Rate : 066 BPM     Atrial Rate : 066 BPM      P-R Int : 134 ms          QRS Dur : 082 ms       QT Int : 382 ms       P-R-T Axes : 027 007 018 degrees      QTc Int : 400 ms      Normal sinus rhythm with sinus arrhythmia   When compared with ECG of 07-DEC-2018 01:44,   No significant change was found   Confirmed by YESI DUMONT MD ([id number]) on 2/11/2020 6:51:03 AM      Referred By:  YOCASTA           Confirmed By:YESI DUMONT MD                    MDM  Number of Diagnoses or Management Options  Cough in adult patient:   Elevated glucose:   Diagnosis management comments:       Reviewed all available studies at bedside with the patient.  His labs are fairly bland save for an elevated glucose.  His chest x-ray is also bland.  May have an underlying rheumatologic condition or a mitochondrial problem but I am not sure if this would cause him to have a chronic cough.  He is already on acid suppression and has had an EGD.  We will start him on an albuterol HFA as well as ipratropium nasal spray to see if this decreases his cough and I will refer him to pulmonary if he has ongoing issues.  He is not on any ACE inhibitor's that I can see.  Also have him follow-up with his primary care provider.    All are agreeable with the plan       Amount and/or Complexity of Data Reviewed  Clinical lab tests:  reviewed  Tests in the radiology section of CPT®: reviewed  Tests in the medicine section of CPT®: reviewed        Final diagnoses:   Cough in adult patient   Elevated glucose            Rony Agarwal  02/11/20 0742       Rony Agarwal  02/11/20 0831       Sukhi Elias MD  02/11/20 9803     Regular Diet - No restrictions

## 2022-03-21 DIAGNOSIS — F90.0 ATTENTION DEFICIT HYPERACTIVITY DISORDER (ADHD), PREDOMINANTLY INATTENTIVE TYPE: ICD-10-CM

## 2022-03-21 NOTE — TELEPHONE ENCOUNTER
2/9/22 telemedicine visit  1/19/22 lov  Patient sent a [employer] message stating he is changing insurances and will schedule a  Appointment in April. Thanks

## 2022-04-06 ENCOUNTER — OFFICE VISIT (OUTPATIENT)
Dept: INTERNAL MEDICINE | Facility: CLINIC | Age: 32
End: 2022-04-06

## 2022-04-06 VITALS
HEART RATE: 86 BPM | WEIGHT: 301 LBS | BODY MASS INDEX: 40.77 KG/M2 | TEMPERATURE: 97.5 F | DIASTOLIC BLOOD PRESSURE: 86 MMHG | OXYGEN SATURATION: 99 % | HEIGHT: 72 IN | SYSTOLIC BLOOD PRESSURE: 128 MMHG

## 2022-04-06 DIAGNOSIS — F90.0 ATTENTION DEFICIT HYPERACTIVITY DISORDER (ADHD), PREDOMINANTLY INATTENTIVE TYPE: ICD-10-CM

## 2022-04-06 PROCEDURE — 99213 OFFICE O/P EST LOW 20 MIN: CPT | Performed by: NURSE PRACTITIONER

## 2022-04-06 NOTE — TELEPHONE ENCOUNTER
Patient would like to increase dose, doesn't feel like it works all day long.  No palpitations, appetite change, insomnia.

## 2022-04-06 NOTE — PROGRESS NOTES
"     Office Visit      Patient Name: Britton Vogt Jr.  : 1990   MRN: [id number]     Chief Complaint:    Chief Complaint   Patient presents with   • Follow-up     Attention deficit hyperactivity disorder        History of Present Illness: Britton Vogt Jr. is a 31 y.o. male who is here today for refill of Vyvanse 50 mg.  UDS, controlled substance contract 22.  Took Vyvanse in college, felt super focused.  More focused now after taking it, but wears off by the end of the day.  Denies insomnia, unintended weight loss, decreased appetite, palpitations, feeling jittery or nervous, diaphoresis, headache.    Subjective      I have reviewed and the following portions of the patient's history were updated as appropriate: past family history, past medical history, past social history, past surgical history and problem list.      Current Outpatient Medications:   •  ibuprofen (ADVIL,MOTRIN) 600 MG tablet, Take 1 tablet by mouth Every 6 (Six) Hours As Needed for Moderate Pain ., Disp: 30 tablet, Rfl: 0  •  amLODIPine (NORVASC) 2.5 MG tablet, Take 1 tablet by mouth Daily., Disp: 30 tablet, Rfl: 1  •  lisdexamfetamine (Vyvanse) 60 MG capsule, Take 1 capsule by mouth Every Morning, Disp: 30 capsule, Rfl: 0  •  Testosterone Cypionate (DEPOTESTOTERONE CYPIONATE) 200 MG/ML injection, , Disp: , Rfl:     No Known Allergies    Objective     Physical Exam:  Vital Signs:   Vitals:    22 0847   BP: 128/86   Pulse: 86   Temp: 97.5 °F (36.4 °C)   SpO2: 99%   Weight: (!) 137 kg (301 lb)   Height: 182.9 cm (72.01\")     Body mass index is 40.81 kg/m².    Physical Exam  Constitutional:       Appearance: He is not ill-appearing.   HENT:      Head: Normocephalic.      Right Ear: External ear normal.      Left Ear: External ear normal.   Eyes:      Conjunctiva/sclera: Conjunctivae normal.      Pupils: Pupils are equal, round, and reactive to light.   Cardiovascular:      Rate and Rhythm: Normal rate and regular rhythm.     "  Pulses:           Radial pulses are 2+ on the right side and 2+ on the left side.        Dorsalis pedis pulses are 2+ on the right side and 2+ on the left side.      Heart sounds: Normal heart sounds.   Pulmonary:      Effort: Pulmonary effort is normal.      Breath sounds: Normal breath sounds.   Musculoskeletal:      Cervical back: Normal range of motion and neck supple.   Skin:     General: Skin is warm.      Capillary Refill: Capillary refill takes less than 2 seconds.   Neurological:      Mental Status: He is alert and oriented to person, place, and time.      Coordination: Coordination normal.      Gait: Gait normal.   Psychiatric:         Attention and Perception: Attention normal.         Mood and Affect: Mood and affect normal.         Speech: Speech normal.         Behavior: Behavior normal.       Assessment / Plan      Assessment/Plan:   Diagnoses and all orders for this visit:    1. Attention deficit hyperactivity disorder (ADHD), predominantly inattentive type       - Refill request will be sent to PCP with request to increase dose           Follow Up:   Return in about 3 months (around 7/6/2022) for Next scheduled follow up.    Patient was given instructions and counseling regarding his condition or for health maintenance advice. Please see specific information pulled into the AVS if appropriate.       [facility] [address] [place]     Please note that portions of this note may have been completed with a voice recognition program. Efforts were made to edit dictation, but occasionally words are mistranscribed.

## 2022-04-12 ENCOUNTER — OFFICE VISIT (OUTPATIENT)
Dept: INTERNAL MEDICINE | Facility: CLINIC | Age: 32
End: 2022-04-12

## 2022-04-12 VITALS
HEART RATE: 68 BPM | WEIGHT: 304 LBS | HEIGHT: 72 IN | SYSTOLIC BLOOD PRESSURE: 140 MMHG | RESPIRATION RATE: 16 BRPM | BODY MASS INDEX: 41.17 KG/M2 | OXYGEN SATURATION: 96 % | DIASTOLIC BLOOD PRESSURE: 86 MMHG | TEMPERATURE: 97.3 F

## 2022-04-12 DIAGNOSIS — I10 ESSENTIAL HYPERTENSION: Primary | ICD-10-CM

## 2022-04-12 DIAGNOSIS — F90.0 ATTENTION DEFICIT HYPERACTIVITY DISORDER (ADHD), PREDOMINANTLY INATTENTIVE TYPE: ICD-10-CM

## 2022-04-12 DIAGNOSIS — Z28.21 COVID-19 VACCINATION DECLINED: ICD-10-CM

## 2022-04-12 PROCEDURE — 99214 OFFICE O/P EST MOD 30 MIN: CPT | Performed by: FAMILY MEDICINE

## 2022-04-12 RX ORDER — AMLODIPINE BESYLATE 2.5 MG/1
2.5 TABLET ORAL DAILY
Qty: 30 TABLET | Refills: 1 | Status: SHIPPED | OUTPATIENT
Start: 2022-04-12

## 2022-04-12 NOTE — PROGRESS NOTES
"2022      Assessment/Plan   Problem List Items Addressed This Visit        Mental Health    Attention deficit hyperactivity disorder (ADHD), predominantly inattentive type    Overview     See scanned media for psychological testing performed by Melly Barber, PhD. Dates of testin11, 11, 10/3/11, and 10/10/11.              Other    COVID-19 vaccination declined      Other Visit Diagnoses     Essential hypertension    -  Primary    Relevant Medications    amLODIPine (NORVASC) 2.5 MG tablet        Pt has ability to monitor blood pressure at home. Goal is <130/80.  Monitor closely.  As dbp has been borderline for some time will go ahead and add amlodipine 2.5 mg daily.  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     ABDIFATAH reviewed and appropriate.          Return for follow up in 2-4 weeks for bp recheck.      Subjective    Britton Vogt Jr. is a 31 y.o. male here for:  Chief Complaint   Patient presents with   • Hypertension     Was 180/100 at work the other day, pt needs clearance to go back to work       History per MA reviewed.    Has had some elevated blood pressures when he wasn't on Vyvanse per other providers  On the higher dose of Vyvanse since last week and it has helped with attention/focus  In [employer] for [employer]   Needs clearance from me due to high blood pressure.          The following portions of the patient's history were reviewed and updated as appropriate: allergies, current medications, past family history, past medical history, past social history, past surgical history and problem list.    Review of Systems   Constitutional: Negative for fever.   Psychiatric/Behavioral: Positive for stress.         Objective   Visit Vitals  /86 (BP Location: Right arm, Patient Position: Sitting, Cuff Size: Adult)   Pulse 68   Temp 97.3 °F (36.3 °C) (Temporal)   Resp 16   Ht 182.9 cm (72\")   Wt (!) 138 kg (304 lb)   SpO2 96% "   BMI 41.23 kg/m²       Physical Exam  Vitals and nursing note reviewed.   Constitutional:       General: He is not in acute distress.     Appearance: Normal appearance. He is well-developed and well-groomed. He is not ill-appearing, toxic-appearing or diaphoretic.      Interventions: Face mask in place.      Comments: Muscular build   HENT:      Head: Normocephalic and atraumatic.      Right Ear: Hearing normal.      Left Ear: Hearing normal.   Eyes:      General: Lids are normal. No scleral icterus.        Right eye: No discharge.         Left eye: No discharge.      Extraocular Movements: Extraocular movements intact.   Cardiovascular:      Rate and Rhythm: Normal rate and regular rhythm.   Pulmonary:      Effort: Pulmonary effort is normal.   Musculoskeletal:      Cervical back: Neck supple.   Skin:     Coloration: Skin is not jaundiced or pale.   Neurological:      General: No focal deficit present.      Mental Status: He is alert and oriented to person, place, and time.   Psychiatric:         Attention and Perception: Attention and perception normal.         Mood and Affect: Mood and affect normal.         Speech: Speech normal.         Behavior: Behavior normal. Behavior is cooperative.         Thought Content: Thought content normal.         Cognition and Memory: Cognition and memory normal.         Judgment: Judgment normal.             For medical decision making review of the following was required:  Compliance Drug Analysis, Ur - Urine, Clean Catch (10/27/2021 16:02)      Lab Results   Component Value Date    TSH 1.440 08/01/2018     Lab Results   Component Value Date    FREET4 0.95 08/01/2018             Eliza Mayberry MD

## 2023-03-07 NOTE — PROGRESS NOTES
Chief Complaint  Sleeping Problem    Subjective     History of Present Illness:  Britton Vogt Jr. is a 32 y.o. male with a history of rhabdomyolysis, MARQUES on CPAP, anxiety, and depression.  Patient was referred to establish care for sleep apnea.  He is a former patient of Dr. Pulido.  A sleep study obtained on 10/31/2019 noted an AHI of 27.5/H.  PAP therapy was initiated.  Patient reports snoring, witnessed apnea, heartburn/reflux.  He is a .  He denies use of tobacco, alcohol, or illicit drugs.  He rarely drinks tea and cola.    Further details are as follows:    [place] Scale is (out of 24): Total score: 0     Estimated average amount of sleep per night: 6-7 off shift, less when at work.  Number of times he wakes up at night: -2  Difficulty falling back asleep: no  It usually takes 5 minutes to go to sleep.  He feels sleepy upon waking up: yes  Rotating or night shift work: yes    Drowsiness/Sleepiness:  He exhibits the following: (Before Pap therapy)  excessive daytime sleepiness  excessive daytime fatigue    Snoring/Breathing:  He exhibits the following:  loud snoring and quits breathing at night    Head Injury:  He exhibits the following:  Yes. Type: college football    Reflux:  He describes the following:  None    Narcolepsy:  He exhibits the following:  none    RLS/PLMs:  He describes the following:  none    Insomnia:  He describes the following:  problems initiating sleep at night- when stressed    Parasomnia:  He exhibits the following:  sleep talks    Weight:       03/08/23  0851   Weight: (!) 140 kg (308 lb)      Weight change in the last year:  gain: 20 lbs    The patient's relevant past medical, surgical, family, and social history reviewed and updated in Epic as appropriate.    Review of Systems   Constitutional: Negative.    HENT: Negative.    Eyes: Negative.    Respiratory: Negative.    Cardiovascular: Negative.    Gastrointestinal: Positive for abdominal pain.   Endocrine:  "Negative.    Genitourinary: Negative.    Musculoskeletal: Negative.    Skin: Negative.    Allergic/Immunologic: Negative.    Neurological: Negative.    Hematological: Negative.    Psychiatric/Behavioral: Negative.    All other systems reviewed and are negative.      PMH:    Past Medical History:   Diagnosis Date   • Anxiety    • Back pain    • COVID-19 7/14/2021   • Depression    • Elevated blood pressure reading    • Elevated CK    • Epididymitis    • Hyporeflexia    • Rhabdomyolysis      Past Surgical History:   Procedure Laterality Date   • COLONOSCOPY     • WISDOM TOOTH EXTRACTION         No Known Allergies    MEDS:  Prior to Admission medications    Medication Sig Start Date End Date Taking? Authorizing Provider   amLODIPine (NORVASC) 2.5 MG tablet Take 1 tablet by mouth Daily. 4/12/22   Eliza Mayberry MD   ibuprofen (ADVIL,MOTRIN) 600 MG tablet Take 1 tablet by mouth Every 6 (Six) Hours As Needed for Moderate Pain . 1/13/21   Tianna Glass APRN   lisdexamfetamine (Vyvanse) 60 MG capsule Take 1 capsule by mouth Every Morning 4/6/22   Eliza Mayberry MD   Testosterone Cypionate (DEPOTESTOTERONE CYPIONATE) 200 MG/ML injection  10/5/21   Provider, MD Melly       FH:  Family History   Problem Relation Age of Onset   • Diabetes Mother    • Hypertension Mother    • Stroke Mother    • Depression Maternal Uncle    • Stroke Maternal Uncle    • Depression Maternal Grandmother    • Stroke Other    • Diabetes Other    • Cancer Other    • Diabetes Other    • Cancer Other    • Diabetes Other    • Cancer Other    • Heart failure Sister 41       Objective   Vital Signs:  /74 (BP Location: Left arm, Patient Position: Sitting)   Pulse 59   Ht 185.4 cm (73\")   Wt (!) 140 kg (308 lb)   SpO2 97%   BMI 40.64 kg/m²     Class 3 Severe Obesity (BMI >=40). Obesity-related health conditions include the following: obstructive sleep apnea. Obesity is improving with lifestyle modifications. BMI is is above " average; BMI management plan is completed. We discussed portion control and increasing exercise.        Physical Exam  Vitals reviewed.   Constitutional:       Appearance: Normal appearance.   HENT:      Head: Normocephalic and atraumatic.      Nose: Nose normal.      Mouth/Throat:      Mouth: Mucous membranes are moist.   Cardiovascular:      Rate and Rhythm: Normal rate and regular rhythm.      Heart sounds: No murmur heard.    No friction rub. No gallop.   Pulmonary:      Effort: Pulmonary effort is normal. No respiratory distress.      Breath sounds: Normal breath sounds. No wheezing or rhonchi.   Neurological:      Mental Status: He is alert and oriented to person, place, and time.   Psychiatric:         Behavior: Behavior normal.         Mallampati Score: III (soft and hard palate and base of uvula visible)    Result Review :           CPAP report:  AHI: 0.2/H  Days used: 90/90 (100%)  >4 hours usage: 85/90 (94%)  Average usage (days used): 7 hours 8 minutes  95th percentile pressure: 13.0 cm H2O  Settings: Auto CPAP-8/20 cm H2O, EPR full-time, EPR level 3, response standard.       Assessment and Plan  Britton Vogt Jr. is a 32 y.o. male who presents to establish care for obstructive sleep apnea currently on PAP therapy.  Patient's compliance report has been reviewed.  He has been fully compliant with greater than 4-hour usage at 94%.  AHI is well-controlled at 0.2/h.  I will refill the patient's supplies and he is asked to return for follow-up compliance in 1 year.    Diagnoses and all orders for this visit:    1. MARQUES (obstructive sleep apnea) (Primary)  -     PAP Therapy                 I discussed the consequences of uncontrolled sleep apnea including hypertension, heart disease, diabetes, stroke, and dementia. I further discussed sleep apnea therapeutic options including CPAP, Weight loss, Oral dental appliance, and surgery.         Follow Up  Return in about 1 year (around 3/8/2024) for Annual  visit.  Patient was given instructions and counseling regarding his condition or for health maintenance advice. Please see specific information pulled into the AVS if appropriate.     RENAY Cadena, ACNP-BC  Pulmonology, Critical Care, and Sleep Medicine

## 2023-03-08 ENCOUNTER — OFFICE VISIT (OUTPATIENT)
Dept: SLEEP MEDICINE | Facility: HOSPITAL | Age: 33
End: 2023-03-08
Payer: COMMERCIAL

## 2023-03-08 VITALS
DIASTOLIC BLOOD PRESSURE: 74 MMHG | WEIGHT: 308 LBS | OXYGEN SATURATION: 97 % | SYSTOLIC BLOOD PRESSURE: 133 MMHG | HEART RATE: 59 BPM | HEIGHT: 73 IN | BODY MASS INDEX: 40.82 KG/M2

## 2023-03-08 DIAGNOSIS — G47.33 OSA (OBSTRUCTIVE SLEEP APNEA): Primary | ICD-10-CM

## 2023-03-08 PROCEDURE — 99213 OFFICE O/P EST LOW 20 MIN: CPT | Performed by: NURSE PRACTITIONER

## 2023-05-12 ENCOUNTER — OFFICE VISIT (OUTPATIENT)
Dept: INTERNAL MEDICINE | Facility: CLINIC | Age: 33
End: 2023-05-12
Payer: COMMERCIAL

## 2023-05-12 VITALS
HEIGHT: 73 IN | DIASTOLIC BLOOD PRESSURE: 80 MMHG | WEIGHT: 310.6 LBS | SYSTOLIC BLOOD PRESSURE: 132 MMHG | OXYGEN SATURATION: 97 % | RESPIRATION RATE: 16 BRPM | HEART RATE: 54 BPM | TEMPERATURE: 97.2 F | BODY MASS INDEX: 41.17 KG/M2

## 2023-05-12 DIAGNOSIS — Z51.81 MEDICATION MONITORING ENCOUNTER: ICD-10-CM

## 2023-05-12 DIAGNOSIS — F90.0 ATTENTION DEFICIT HYPERACTIVITY DISORDER (ADHD), PREDOMINANTLY INATTENTIVE TYPE: Primary | ICD-10-CM

## 2023-05-12 PROBLEM — G47.33 OBSTRUCTIVE SLEEP APNEA SYNDROME: Status: ACTIVE | Noted: 2020-11-06

## 2023-05-12 PROBLEM — R73.03 PREDIABETES: Status: ACTIVE | Noted: 2023-05-12

## 2023-05-12 NOTE — PROGRESS NOTES
"Chief Complaint  ADHD (Pt would like to start back on vyvanse)    Subjective        Britton Vogt Jr. presents to [facility]  History of Present Illness  Has been off Vyvanse due to costs. Wants to resume. Aware to watch blood pressure,  and has ability to monitor regularly at work. Was on 60 mg, open to titration back up as needed. No other needs today. Things going well, son is going to be 4!      Objective   Vital Signs:  /80 (BP Location: Right arm, Patient Position: Sitting, Cuff Size: Adult)   Pulse 54   Temp 97.2 °F (36.2 °C) (Temporal)   Resp 16   Ht 185.4 cm (73\")   Wt (!) 141 kg (310 lb 9.6 oz)   SpO2 97%   BMI 40.98 kg/m²   Estimated body mass index is 40.98 kg/m² as calculated from the following:    Height as of this encounter: 185.4 cm (73\").    Weight as of this encounter: 141 kg (310 lb 9.6 oz).             Physical Exam  Vitals and nursing note reviewed.   Constitutional:       General: He is not in acute distress.     Appearance: Normal appearance. He is well-developed and well-groomed. He is not ill-appearing, toxic-appearing or diaphoretic.   HENT:      Head: Normocephalic and atraumatic.      Right Ear: Hearing normal.      Left Ear: Hearing normal.   Eyes:      General: Lids are normal. No scleral icterus.        Right eye: No discharge.         Left eye: No discharge.      Extraocular Movements: Extraocular movements intact.   Cardiovascular:      Rate and Rhythm: Bradycardia present.   Pulmonary:      Effort: Pulmonary effort is normal.   Musculoskeletal:      Cervical back: Neck supple.   Skin:     Coloration: Skin is not jaundiced or pale.   Neurological:      General: No focal deficit present.      Mental Status: He is alert and oriented to person, place, and time.   Psychiatric:         Attention and Perception: Attention and perception normal.         Mood and Affect: Mood and affect normal.         Speech: Speech normal.         " Behavior: Behavior normal. Behavior is cooperative.         Thought Content: Thought content normal.         Cognition and Memory: Cognition and memory normal.         Judgment: Judgment normal.        Result Review :                   Assessment and Plan   Diagnoses and all orders for this visit:    1. Attention deficit hyperactivity disorder (ADHD), predominantly inattentive type (Primary)  -     Urine Drug Screen - Urine, Clean Catch  -     lisdexamfetamine (Vyvanse) 40 MG capsule; Take 1 capsule by mouth Every Morning  Dispense: 30 capsule; Refill: 0    2. Medication monitoring encounter  -     Urine Drug Screen - Urine, Clean Catch      Resuming med but start at 40 mg. Let pt know about my time out of office next month, must let me know no later than 6/8 morning that he needs refill (either stay at 40 if working or can go up to 50 mg). Can adjust dose monthly up to 60 mg if needed, which is where he was previously. Monitor blood pressure. Goal <130/80.       Follow Up   Return in about 3 months (around 8/12/2023) for Annual physical, Controlled Rx Follow Up.  Patient was given instructions and counseling regarding his condition or for health maintenance advice. Please see specific information pulled into the AVS if appropriate.

## 2023-05-16 LAB
AMPHETAMINES UR QL SCN: NEGATIVE NG/ML
BARBITURATES UR QL SCN: NEGATIVE NG/ML
BENZODIAZ UR QL SCN: NEGATIVE NG/ML
BZE UR QL SCN: NEGATIVE NG/ML
CANNABINOIDS UR QL SCN: NEGATIVE NG/ML
CREAT UR-MCNC: 195.8 MG/DL (ref 20–300)
LABORATORY COMMENT REPORT: NORMAL
METHADONE UR QL SCN: NEGATIVE NG/ML
OPIATES UR QL SCN: NEGATIVE NG/ML
OXYCODONE+OXYMORPHONE UR QL SCN: NEGATIVE NG/ML
PCP UR QL: NEGATIVE NG/ML
PH UR: 5.4 [PH] (ref 4.5–8.9)
PROPOXYPH UR QL SCN: NEGATIVE NG/ML

## 2023-06-06 ENCOUNTER — PATIENT MESSAGE (OUTPATIENT)
Dept: INTERNAL MEDICINE | Facility: CLINIC | Age: 33
End: 2023-06-06
Payer: COMMERCIAL

## 2023-06-06 DIAGNOSIS — F90.0 ATTENTION DEFICIT HYPERACTIVITY DISORDER (ADHD), PREDOMINANTLY INATTENTIVE TYPE: ICD-10-CM

## 2023-06-06 NOTE — TELEPHONE ENCOUNTER
From: Eliza Mayberry  To: Britton Vogt Jr.  Sent: 6/6/2023 7:46 AM EDT  Subject: med refill    Reminder that I will be out of the office later this week. For your med refill, do you feel the dose needs to stay the same or go up to the next dose? I'd like to get your refill in before I'm out.    Dr. Mayberry

## 2023-08-14 ENCOUNTER — PATIENT MESSAGE (OUTPATIENT)
Dept: INTERNAL MEDICINE | Facility: CLINIC | Age: 33
End: 2023-08-14
Payer: COMMERCIAL

## 2023-08-14 DIAGNOSIS — F90.0 ATTENTION DEFICIT HYPERACTIVITY DISORDER (ADHD), PREDOMINANTLY INATTENTIVE TYPE: ICD-10-CM

## 2023-08-15 NOTE — TELEPHONE ENCOUNTER
From: Britton Vogt Jr.  To: Eliza Mayberry  Sent: 8/14/2023 6:11 PM EDT  Subject: Vyvanse Prescription     Hello Dr. Mayberry, I'm a little concerned because I haven't received any calls or texts from my pharmacy in regards to my Vyvanse prescription refill and I know you had mentioned a refill a little while ago. Should I call them?

## 2024-05-06 ENCOUNTER — OFFICE VISIT (OUTPATIENT)
Dept: SLEEP MEDICINE | Age: 34
End: 2024-05-06
Payer: COMMERCIAL

## 2024-05-06 VITALS
OXYGEN SATURATION: 97 % | WEIGHT: 315 LBS | BODY MASS INDEX: 41.75 KG/M2 | SYSTOLIC BLOOD PRESSURE: 120 MMHG | HEIGHT: 73 IN | DIASTOLIC BLOOD PRESSURE: 82 MMHG | TEMPERATURE: 97.9 F | HEART RATE: 73 BPM

## 2024-05-06 DIAGNOSIS — E66.01 MORBID (SEVERE) OBESITY DUE TO EXCESS CALORIES: ICD-10-CM

## 2024-05-06 DIAGNOSIS — G47.33 OBSTRUCTIVE SLEEP APNEA, ADULT: Primary | ICD-10-CM

## 2024-05-06 PROCEDURE — 99213 OFFICE O/P EST LOW 20 MIN: CPT | Performed by: NURSE PRACTITIONER

## 2024-10-18 ENCOUNTER — TELEPHONE (OUTPATIENT)
Dept: PULMONOLOGY | Facility: CLINIC | Age: 34
End: 2024-10-18

## 2024-10-18 NOTE — TELEPHONE ENCOUNTER
Caller: [facility]     Relationship to Patient: SELF    Phone Number:     [phone]       Reason for Call: THE PT NEEDS A NEW ORDER FOR A CPAP MACHINE SENT TO [facility]. PLEASE ADVISE

## 2024-10-21 DIAGNOSIS — G47.33 OBSTRUCTIVE SLEEP APNEA, ADULT: Primary | ICD-10-CM

## 2025-01-29 NOTE — PROGRESS NOTES
Sleep Clinic Follow Up Note    Chief Complaint  Sleeping Problem, Sleep Apnea, and Follow-up    Subjective     History of Present Illness (from previous encounter on 5/6/2020):  Britton Vogt Jr. is a 33 y.o. male who returns for follow-up and compliance of PAP therapy.  The Pap report has been reviewed.  Overall usage is at 100% with compliance at 80%.  Patient averages 6 hours and 51 minutes of therapy.  Sleep apnea is well-controlled with an AHI of 0.3/H. I will refill the patient's supplies, and I have asked them to return for follow-up and compliance in 1 year or sooner should they have further questions or concerns. He will likely need a new device in November as his will be over 5 years old. (End copied text).    Interval History:  Britton Vogt Jr. is a 34 y.o. male returns for follow up and compliance of PAP therapy. The patient was last seen on 5/6/2024 by me. Overall the patient feels good with regard to therapy. The device appears to be working appropriately. On average the patient sleeps 6-8 hours per night. The patient wakes 0-1 times per night.     The patient reports the following changes to their medical and medication history since they were last seen:  None    Further details are as follows:      [place] Scale is (out of 24): Total score: 2     Weight:  Current Weight: 334 lb    Weight change in the last year:  gain: 0 lbs    The patient's relevant past medical, surgical, family, and social history reviewed and updated in Epic as appropriate.    PMH:    Past Medical History:   Diagnosis Date    Anxiety     Back pain     COVID-19 7/14/2021    Depression     Elevated blood pressure reading     Elevated CK     Epididymitis     Hyporeflexia     Rhabdomyolysis      Past Surgical History:   Procedure Laterality Date    COLONOSCOPY      WISDOM TOOTH EXTRACTION         No Known Allergies    MEDS:  Prior to Admission medications    Medication Sig Start Date End Date Taking? Authorizing Provider  "  ibuprofen (ADVIL,MOTRIN) 600 MG tablet Take 1 tablet by mouth Every 6 (Six) Hours As Needed for Moderate Pain .  Patient not taking: Reported on 5/6/2024 1/13/21   Tianna Glass APRN   lisdexamfetamine (Vyvanse) 40 MG capsule Take 1 capsule by mouth Every Morning  Patient not taking: Reported on 5/6/2024 8/15/23   Eliza Mayberry MD   Testosterone Cypionate (DEPOTESTOTERONE CYPIONATE) 200 MG/ML injection  10/5/21   Provider, MD Melly         FH:  Family History   Problem Relation Age of Onset    Diabetes Mother     Hypertension Mother     Stroke Mother     Depression Maternal Uncle     Stroke Maternal Uncle     Depression Maternal Grandmother     Stroke Other     Diabetes Other     Cancer Other     Diabetes Other     Cancer Other     Diabetes Other     Cancer Other     Heart failure Sister 41       Objective   Vital Signs:  /70 (BP Location: Left arm, Patient Position: Sitting, Cuff Size: Adult)   Pulse 66   Temp 97.8 °F (36.6 °C) (Temporal)   Ht 185.4 cm (72.99\")   Wt (!) 152 kg (334 lb 6.4 oz)   SpO2 97%   BMI 44.13 kg/m²              Physical Exam  Vitals reviewed.   Constitutional:       Appearance: Normal appearance.   HENT:      Head: Normocephalic and atraumatic.      Nose: Nose normal.      Mouth/Throat:      Mouth: Mucous membranes are moist.   Cardiovascular:      Rate and Rhythm: Normal rate and regular rhythm.      Heart sounds: No murmur heard.     No friction rub. No gallop.   Pulmonary:      Effort: Pulmonary effort is normal. No respiratory distress.      Breath sounds: Normal breath sounds. No wheezing or rhonchi.   Neurological:      Mental Status: He is alert and oriented to person, place, and time.   Psychiatric:         Behavior: Behavior normal.               Result Review :           PAP Report:  AHI: 0.4/h  Days of Usage: 30/30 (100%)  Number of Days Greater than 4 hours: 28/30 (93%)  Average time (days used): 7 hours 41 minutes  95th Percentile Pressure: 12.6 " cmH2O  95th percentile leaks: 2.4 L/min  Settings: Auto CPAP-8/20 cm H2O, EPR full-time, EPR level 3, response standard       Assessment and Plan  Britton Vogt Jr. is a 34 y.o. male who returns for follow-up and compliance of PAP therapy.  The Pap report has been reviewed.  Overall usage is at 100% with compliance at 93%.  Patient averages 7 hours and 41 minutes of therapy.  Sleep apnea is well-controlled with an AHI of 0.4/h.     I will refill the patient's supplies, and I have asked them to return for follow-up and compliance in 1 year or sooner should they have further questions or concerns.    Diagnoses and all orders for this visit:    1. Obstructive sleep apnea, adult (Primary)  -     PAP Therapy    2. Morbid (severe) obesity due to excess calories         The patient continues to use and benefit from PAP therapy.    1. The patient was counseled regarding multimodal approach with healthy nutrition, healthy sleep, regular physical activity, social activities, counseling, and medications. Encouraged to practice lateral sleep position. Avoid alcohol and sedatives close to bedtime.     2.  We will refill supplies x1 year.  Return to clinic 1 year or sooner if symptoms warrant. I have reviewed the results of my evaluation and impression and discussed my recommendations in detail with the patient.           Follow Up  Return in about 1 year (around 2/3/2026) for Annual visit.  Patient was given instructions and counseling regarding his condition or for health maintenance advice. Please see specific information pulled into the AVS if appropriate.       RENAY Cadena, [facility] Hospital[facility]-BC  Pulmonology, Critical Care, and Sleep Medicine     Universal Safety Interventions

## 2025-02-03 ENCOUNTER — OFFICE VISIT (OUTPATIENT)
Dept: SLEEP MEDICINE | Age: 35
End: 2025-02-03
Payer: COMMERCIAL

## 2025-02-03 VITALS
BODY MASS INDEX: 41.75 KG/M2 | DIASTOLIC BLOOD PRESSURE: 70 MMHG | TEMPERATURE: 97.8 F | WEIGHT: 315 LBS | HEIGHT: 73 IN | SYSTOLIC BLOOD PRESSURE: 130 MMHG | HEART RATE: 66 BPM | OXYGEN SATURATION: 97 %

## 2025-02-03 DIAGNOSIS — G47.33 OBSTRUCTIVE SLEEP APNEA, ADULT: Primary | ICD-10-CM

## 2025-02-03 DIAGNOSIS — E66.01 MORBID (SEVERE) OBESITY DUE TO EXCESS CALORIES: ICD-10-CM

## 2025-02-03 PROCEDURE — 99213 OFFICE O/P EST LOW 20 MIN: CPT | Performed by: NURSE PRACTITIONER

## 2025-07-02 ENCOUNTER — PATIENT MESSAGE (OUTPATIENT)
Dept: INTERNAL MEDICINE | Facility: CLINIC | Age: 35
End: 2025-07-02
Payer: COMMERCIAL

## 2025-07-16 ENCOUNTER — OFFICE VISIT (OUTPATIENT)
Dept: INTERNAL MEDICINE | Facility: CLINIC | Age: 35
End: 2025-07-16
Payer: COMMERCIAL

## 2025-07-16 VITALS
TEMPERATURE: 98 F | SYSTOLIC BLOOD PRESSURE: 124 MMHG | OXYGEN SATURATION: 96 % | WEIGHT: 315 LBS | HEIGHT: 73 IN | DIASTOLIC BLOOD PRESSURE: 84 MMHG | BODY MASS INDEX: 41.75 KG/M2 | HEART RATE: 88 BPM

## 2025-07-16 DIAGNOSIS — F90.0 ATTENTION DEFICIT HYPERACTIVITY DISORDER (ADHD), PREDOMINANTLY INATTENTIVE TYPE: Primary | ICD-10-CM

## 2025-07-16 PROCEDURE — 99213 OFFICE O/P EST LOW 20 MIN: CPT | Performed by: FAMILY MEDICINE

## 2025-07-16 RX ORDER — TADALAFIL 20 MG/1
20 TABLET ORAL
COMMUNITY
Start: 2025-06-27 | End: 2025-10-15

## 2025-07-16 RX ORDER — LISDEXAMFETAMINE DIMESYLATE 40 MG/1
40 CAPSULE ORAL EVERY MORNING
Qty: 30 CAPSULE | Refills: 0 | Status: SHIPPED | OUTPATIENT
Start: 2025-07-16

## 2025-07-16 RX ORDER — PROMETHAZINE HYDROCHLORIDE 12.5 MG/1
TABLET ORAL
COMMUNITY
Start: 2025-06-02

## 2025-07-16 RX ORDER — SULFAMETHOXAZOLE AND TRIMETHOPRIM 800; 160 MG/1; MG/1
TABLET ORAL
COMMUNITY
Start: 2025-06-27

## 2025-07-16 NOTE — ASSESSMENT & PLAN NOTE
Resuming Vyvanse at 40 mg  Return to clinic 8/5 for follow up and physical  Will need UDS and updated drug contract  Will need to monitor blood pressure.

## 2025-07-16 NOTE — PROGRESS NOTES
"Chief Complaint  ADHD (Would like to restart Vyvanse. 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 )    Subjective        Britton Vogt Jr. 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    Weight as of this encounter: 151 kg (333 lb 6.4 oz).          Physical Exam  Vitals and nursing note reviewed.   Constitutional:       General: He is not in acute distress.     Appearance: Normal appearance. He is well-developed and well-groomed. He is obese. He is not ill-appearing, toxic-appearing or diaphoretic.   HENT:      Head: Normocephalic and atraumatic.      Right Ear: Hearing normal.      Left Ear: Hearing normal.   Eyes:      General: Lids are normal. No scleral icterus.        Right eye: No discharge.         Left eye: No discharge.      Extraocular Movements: Extraocular movements intact.   Cardiovascular:      Rate and Rhythm: Normal rate and regular rhythm.   Pulmonary:      Effort: Pulmonary effort is normal.   Musculoskeletal:      Cervical back: Neck supple.   Skin:     Coloration: Skin is not jaundiced or pale.   Neurological:      General: No focal deficit present.      Mental Status: He is alert and oriented to person, place, and time.   Psychiatric:         Attention and Perception: Attention and perception normal.         Mood and Affect: Mood and affect normal.         Speech: Speech normal.         Behavior: Behavior normal. Behavior is cooperative.         Thought Content: Thought content normal.         Cognition and " Memory: Cognition and memory normal.         Judgment: Judgment normal.          Result Review :  The following data was reviewed by: Eliza Mayberry MD on 2025:  Urine Drug Screen - Urine, Clean Catch (2023 11:24)            Assessment and Plan   Diagnoses and all orders for this visit:    1. Attention deficit hyperactivity disorder (ADHD), predominantly inattentive type (Primary)  Overview:  See scanned media for psychological testing performed by Melly Barber, PhD. Dates of testin11, 11, 10/3/11, and 10/10/11.    Assessment & Plan:  Resuming Vyvanse at 40 mg  Return to clinic  for follow up and physical  Will need UDS and updated drug contract  Will need to monitor blood pressure.    Orders:  -     lisdexamfetamine (Vyvanse) 40 MG capsule; Take 1 capsule by mouth Every Morning  Dispense: 30 capsule; Refill: 0                  Follow Up   Return in 20 days (on 2025) for Annual physical.  Patient was given instructions and counseling regarding his condition or for health maintenance advice. Please see specific information pulled into the AVS if appropriate.

## 2025-08-05 ENCOUNTER — OFFICE VISIT (OUTPATIENT)
Dept: INTERNAL MEDICINE | Facility: CLINIC | Age: 35
End: 2025-08-05
Payer: COMMERCIAL

## 2025-08-05 VITALS
WEIGHT: 315 LBS | SYSTOLIC BLOOD PRESSURE: 130 MMHG | HEART RATE: 76 BPM | DIASTOLIC BLOOD PRESSURE: 80 MMHG | BODY MASS INDEX: 41.75 KG/M2 | TEMPERATURE: 97.8 F | OXYGEN SATURATION: 98 % | HEIGHT: 73 IN

## 2025-08-05 DIAGNOSIS — Z51.81 MEDICATION MONITORING ENCOUNTER: ICD-10-CM

## 2025-08-05 DIAGNOSIS — F90.0 ATTENTION DEFICIT HYPERACTIVITY DISORDER (ADHD), PREDOMINANTLY INATTENTIVE TYPE: ICD-10-CM

## 2025-08-05 DIAGNOSIS — Z00.00 ANNUAL PHYSICAL EXAM: Primary | ICD-10-CM

## 2025-08-05 PROCEDURE — 99395 PREV VISIT EST AGE 18-39: CPT | Performed by: FAMILY MEDICINE

## 2025-08-05 RX ORDER — LISDEXAMFETAMINE DIMESYLATE 40 MG/1
40 CAPSULE ORAL EVERY MORNING
Qty: 90 CAPSULE | Refills: 0 | Status: SHIPPED | OUTPATIENT
Start: 2025-08-05

## 2025-08-07 LAB
AMPHETAMINES UR QL SCN: POSITIVE NG/ML
BARBITURATES UR QL SCN: NEGATIVE NG/ML
BENZODIAZ UR QL SCN: NEGATIVE NG/ML
BZE UR QL SCN: NEGATIVE NG/ML
CANNABINOIDS UR QL SCN: NEGATIVE NG/ML
CREAT UR-MCNC: 208 MG/DL (ref 20–300)
LABORATORY COMMENT REPORT: ABNORMAL
METHADONE UR QL SCN: NEGATIVE NG/ML
OPIATES UR QL SCN: NEGATIVE NG/ML
OXYCODONE+OXYMORPHONE UR QL SCN: NEGATIVE NG/ML
PCP UR QL: NEGATIVE NG/ML
PH UR: 5.2 [PH] (ref 4.5–8.9)
PROPOXYPH UR QL SCN: NEGATIVE NG/ML